# Patient Record
Sex: FEMALE | Race: WHITE | Employment: OTHER | URBAN - METROPOLITAN AREA
[De-identification: names, ages, dates, MRNs, and addresses within clinical notes are randomized per-mention and may not be internally consistent; named-entity substitution may affect disease eponyms.]

---

## 2017-06-21 ENCOUNTER — OFFICE VISIT (OUTPATIENT)
Dept: UROLOGY | Facility: CLINIC | Age: 77
End: 2017-06-21
Payer: COMMERCIAL

## 2017-06-21 DIAGNOSIS — N32.81 OAB (OVERACTIVE BLADDER): Primary | ICD-10-CM

## 2017-06-21 LAB
ALBUMIN UR-MCNC: NEGATIVE MG/DL
APPEARANCE UR: CLEAR
BILIRUB UR QL STRIP: NEGATIVE
COLOR UR AUTO: YELLOW
GLUCOSE UR STRIP-MCNC: NEGATIVE MG/DL
HGB UR QL STRIP: ABNORMAL
KETONES UR STRIP-MCNC: ABNORMAL MG/DL
LEUKOCYTE ESTERASE UR QL STRIP: ABNORMAL
NITRATE UR QL: POSITIVE
PH UR STRIP: 5 PH (ref 5–7)
SP GR UR STRIP: 1.02 (ref 1–1.03)
URN SPEC COLLECT METH UR: ABNORMAL
UROBILINOGEN UR STRIP-ACNC: 0.2 EU/DL (ref 0.2–1)

## 2017-06-21 PROCEDURE — 52287 CYSTOSCOPY CHEMODENERVATION: CPT | Performed by: UROLOGY

## 2017-06-21 PROCEDURE — 81003 URINALYSIS AUTO W/O SCOPE: CPT | Mod: QW | Performed by: UROLOGY

## 2017-06-21 NOTE — MR AVS SNAPSHOT
"              After Visit Summary   2017    Paulo Giron    MRN: [id number]           Patient Information     Date Of Birth          1940        Visit Information        Provider Department      2017 1:00 PM Osmin Guzman MD [facility] Bladder Control [facility]        Today's Diagnoses     OAB (overactive bladder)    -  1       Follow-ups after your visit        Follow-up notes from your care team     Return in about 2 weeks (around 2017).      Who to contact     If you have questions or need follow up information about today's clinic visit or your schedule please contact [facility] BLADDER CONTROL [facility] directly at [phone].  Normal or non-critical lab and imaging results will be communicated to you by MyChart, letter or phone within 4 business days after the clinic has received the results. If you do not hear from us within 7 days, please contact the clinic through MyChart or phone. If you have a critical or abnormal lab result, we will notify you by phone as soon as possible.  Submit refill requests through StrikeIron or call your pharmacy and they will forward the refill request to us. Please allow 3 business days for your refill to be completed.          Additional Information About Your Visit        MyChart Information     [employer] lets you send messages to your doctor, view your test results, renew your prescriptions, schedule appointments and more. To sign up, go to [email or web address] . Click on \"Log in\" on the left side of the screen, which will take you to the Welcome page. Then click on \"Sign up Now\" on the right side of the page.     You will be asked to enter the access code listed below, as well as some personal information. Please follow the directions to create your username and password.     Your access code is: [email or web address]  Expires: 2017  1:46 PM     Your access code will  in 90 days. If you need help or a new code, please " call your [place] clinic or [phone].        Care EveryWhere ID     This is your Care EveryWhere ID. This could be used by other organizations to access your [place] medical records  [id number]         Blood Pressure from Last 3 Encounters:   No data found for BP    Weight from Last 3 Encounters:   No data found for Wt              We Performed the Following     BOTULINUM TOXIN A PER UNIT     HC CYSTOURETHROSCOPY INJ CHEMODENERVATION BLADDER     UA without Microscopic        Primary Care Provider Office Phone # Fax #    Kristie Bowen PA-C [phone]       [facility] [place] [address] [facility] [address]  [facility] [address]        Equal Access to Services     [facility]: Hadii aad ku hadasho Soomaali, waaxda luqadaha, qaybta kaalmada adeegyada, waxfatou sterling hayaan aderodrigo wilson . So [facility] [phone].    ATENCIÓN: Si habla español, tiene a michaud disposición servicios gratuitos de asistencia lingüística. Llame al [phone].    We comply with applicable federal civil rights laws and [place] laws. We do not discriminate on the basis of race, color, national origin, age, disability sex, sexual orientation or gender identity.            Thank you!     Thank you for choosing [facility] FOR BLADDER CONTROL [facility]  for your care. Our goal is always to provide you with excellent care. Hearing back from our patients is one way we can continue to improve our services. Please take a few minutes to complete the written survey that you may receive in the mail after your visit with us. Thank you!             Your Updated Medication List - Protect others around you: Learn how to safely use, store and throw away your medicines at www.disposemymeds.org.          This list is accurate as of: 6/21/17  1:46 PM.  Always use your most recent med list.                   Brand Name Dispense Instructions for use Diagnosis    LOSARTAN POTASSIUM PO      Take 25 mg by mouth daily        OMEPRAZOLE PO       Take 20 mg by mouth every morning        SIMVASTATIN PO      Take 20 mg by mouth At Bedtime        triamcinolone 55 MCG/ACT Inhaler    NASACORT     Spray 2 sprays into both nostrils

## 2017-06-21 NOTE — PROGRESS NOTES
F/u OAB wet + probable ISD, satisfactory support, failed meds, and PT, s/p Botox #3 on 11/30/16    Has had some recurrence of OAB wet, is requesting next Botox.     Again discussed mech of action, risks, complications, recovery, results, need for repeat injections, retention; informed consent obtained.        Current Outpatient Prescriptions   Medication     SIMVASTATIN PO     LOSARTAN POTASSIUM PO     OMEPRAZOLE PO     triamcinolone (NASACORT) 55 MCG/ACT nasal inhaler     No current facility-administered medications for this visit.          Results for orders placed or performed in visit on 06/21/17   UA without Microscopic   Result Value Ref Range    Color Urine Yellow     Appearance Urine Clear     Glucose Urine Negative NEG mg/dL    Bilirubin Urine Negative NEG    Ketones Urine Trace (A) NEG mg/dL    Specific Gravity Urine 1.025 1.003 - 1.035    Blood Urine Small (A) NEG    pH Urine 5.0 5.0 - 7.0 pH    Protein Albumin Urine Negative NEG mg/dL    Urobilinogen Urine 0.2 0.2 - 1.0 EU/dL    Nitrite Urine Positive (A) NEG    Leukocyte Esterase Urine Moderate (A) NEG    Source Midstream Urine        Urojet for 10 minutes, then Botox A 100 units (lot [id number], Jan 2020) into 10 cc saline, then injected into posterior wall at 3 sites; excellent blebs and hemostasis.          IMP:  1. Botox for OAB wet      PLAN:  1. Discussed situation with patient in detail.  2. RTC 2 wks to review progress  3. Precautions  4. Levoquin 250 x 1 (pt has at home)  5. Copy A Jessi

## 2017-07-05 ENCOUNTER — OFFICE VISIT (OUTPATIENT)
Dept: UROLOGY | Facility: CLINIC | Age: 77
End: 2017-07-05
Payer: COMMERCIAL

## 2017-07-05 VITALS — SYSTOLIC BLOOD PRESSURE: 128 MMHG | HEART RATE: 72 BPM | DIASTOLIC BLOOD PRESSURE: 86 MMHG | RESPIRATION RATE: 12 BRPM

## 2017-07-05 DIAGNOSIS — N39.46 MIXED INCONTINENCE: Primary | ICD-10-CM

## 2017-07-05 PROCEDURE — 99213 OFFICE O/P EST LOW 20 MIN: CPT | Mod: 25 | Performed by: UROLOGY

## 2017-07-05 PROCEDURE — 51798 US URINE CAPACITY MEASURE: CPT | Performed by: UROLOGY

## 2017-07-05 NOTE — MR AVS SNAPSHOT
"              After Visit Summary   7/5/2017    Paulo Giron    MRN: [id number]           Patient Information     Date Of Birth          1940        Visit Information        Provider Department      7/5/2017 11:15 AM Osmin Guzman MD [facility]        Today's Diagnoses     Mixed incontinence    -  1       Follow-ups after your visit        Follow-up notes from your care team     Return in about 6 months (around 1/5/2018).      Your next 10 appointments already scheduled     Jan 11, 2018 10:30 AM CST   Return Visit with [facility]   [facility] ([facility])    [address] [place] [id number] [phone]              Who to contact     If you have questions or need follow up information about today's clinic visit or your schedule please contact [facility] directly at [phone].  Normal or non-critical lab and imaging results will be communicated to you by MyChart, letter or phone within 4 business days after the clinic has received the results. If you do not hear from us within 7 days, please contact the clinic through Medmonkhart or phone. If you have a critical or abnormal lab result, we will notify you by phone as soon as possible.  Submit refill requests through [employer] or call your pharmacy and they will forward the refill request to us. Please allow 3 business days for your refill to be completed.          Additional Information About Your Visit        Medmonkhart Information     [employer] lets you send messages to your doctor, view your test results, renew your prescriptions, schedule appointments and more. To sign up, go to [email or web address] . Click on \"Log in\" on the left side of the screen, which will take you to the Welcome page. Then click on \"Sign up Now\" on the right side of the page.     You will be asked to enter the access code listed below, as well as some personal information. Please follow the " directions to create your username and password.     Your access code is: [email or web address]  Expires: 2017  1:46 PM     Your access code will  in 90 days. If you need help or a new code, please call your [place] clinic or [phone].        Care EveryWhere ID     This is your Care EveryWhere ID. This could be used by other organizations to access your [place] medical records  [id number]        Your Vitals Were     Pulse Respirations                72 12           Blood Pressure from Last 3 Encounters:   17 128/86    Weight from Last 3 Encounters:   No data found for Wt              We Performed the Following     MEASURE POST-VOID RESIDUAL URINE/BLADDER CAPACITY, US NON-IMAGING (86602)     UA reflex to Microscopic and Culture        Primary Care Provider Office Phone # Fax #    Kristie Bowen PA-C [phone]       [facility] [place] [address]  [place] [address]        Equal Access to Services     [facility] : Hadii aad ku hadasho Soomaali, waaxda luqadaha, qaybta kaalmada adeegyada, waxay idiin hayaan anneeg khpablito wilson . So [facility] [phone].    ATENCIÓN: Si habla español, tiene a michaud disposición servicios gratuitos de asistencia lingüística. Llame al [phone].    We comply with applicable federal civil rights laws and [place] laws. We do not discriminate on the basis of race, color, national origin, age, disability sex, sexual orientation or gender identity.            Thank you!     Thank you for choosing [facility]  for your care. Our goal is always to provide you with excellent care. Hearing back from our patients is one way we can continue to improve our services. Please take a few minutes to complete the written survey that you may receive in the mail after your visit with us. Thank you!             Your Updated Medication List - Protect others around you: Learn how to safely use, store and throw away your medicines at www.disposemymeds.org.           This list is accurate as of: 7/5/17 11:30 AM.  Always use your most recent med list.                   Brand Name Dispense Instructions for use Diagnosis    LOSARTAN POTASSIUM PO      Take 25 mg by mouth daily        SIMVASTATIN PO      Take 20 mg by mouth At Bedtime        triamcinolone 55 MCG/ACT Inhaler    NASACORT     Spray 2 sprays into both nostrils

## 2017-07-05 NOTE — PROGRESS NOTES
Bladder Scan performed. 151 maximum residual urine detected after 3 scans. MD shawanda Villeda RN

## 2017-07-05 NOTE — PROGRESS NOTES
F/u OAB wet, ISD, Botox #4 with 100 units 6/21/17    Doing very well.    Denies dysuria, gross hematuria, frequency; can go 2 hours or shop 2 places without voiding. Noc x 1.    Wears one med pad per day and one per nite; only mild moisture when she takes it off.     Quite pleased      PVR: 151 cc    (Pt unable to void)      IMP:  1. OAB wet + ISD; doing very well on Botox      PLAN:  1. Discussed situation with patient in detail.  2. RTC 6 mos or sooner prn  3. 15 minutes spent with patient, more than 50% spent in counseling and coordination of care for OAB/ISD.  4. Copy A Bowen

## 2017-07-05 NOTE — NURSING NOTE
Chief Complaint   Patient presents with     RECHECK       Initial /86 (BP Location: Right arm, Patient Position: Chair, Cuff Size: Adult Regular)  Pulse 72  Resp 12 There is no height or weight on file to calculate BMI.  Medication Reconciliation: complete   Nayeli Villeda RN

## 2018-02-01 ENCOUNTER — OFFICE VISIT (OUTPATIENT)
Dept: UROLOGY | Facility: CLINIC | Age: 78
End: 2018-02-01
Payer: COMMERCIAL

## 2018-02-01 ENCOUNTER — TELEPHONE (OUTPATIENT)
Dept: UROLOGY | Facility: CLINIC | Age: 78
End: 2018-02-01

## 2018-02-01 VITALS — DIASTOLIC BLOOD PRESSURE: 86 MMHG | HEART RATE: 75 BPM | OXYGEN SATURATION: 96 % | SYSTOLIC BLOOD PRESSURE: 164 MMHG

## 2018-02-01 DIAGNOSIS — N39.46 MIXED INCONTINENCE: Primary | ICD-10-CM

## 2018-02-01 DIAGNOSIS — N32.81 OAB (OVERACTIVE BLADDER): ICD-10-CM

## 2018-02-01 DIAGNOSIS — N95.2 ATROPHIC VAGINITIS: ICD-10-CM

## 2018-02-01 LAB
ALBUMIN UR-MCNC: NEGATIVE MG/DL
APPEARANCE UR: CLEAR
BACTERIA #/AREA URNS HPF: ABNORMAL /HPF
BILIRUB UR QL STRIP: NEGATIVE
COLOR UR AUTO: YELLOW
GLUCOSE UR STRIP-MCNC: NEGATIVE MG/DL
HGB UR QL STRIP: NEGATIVE
KETONES UR STRIP-MCNC: NEGATIVE MG/DL
LEUKOCYTE ESTERASE UR QL STRIP: ABNORMAL
NITRATE UR QL: NEGATIVE
NON-SQ EPI CELLS #/AREA URNS LPF: ABNORMAL /LPF
PH UR STRIP: 6 PH (ref 5–7)
RBC #/AREA URNS AUTO: ABNORMAL /HPF
SOURCE: ABNORMAL
SP GR UR STRIP: <=1.005 (ref 1–1.03)
UROBILINOGEN UR STRIP-ACNC: 0.2 EU/DL (ref 0.2–1)
WBC #/AREA URNS AUTO: ABNORMAL /HPF
WBC CLUMPS #/AREA URNS HPF: PRESENT /HPF

## 2018-02-01 PROCEDURE — 81001 URINALYSIS AUTO W/SCOPE: CPT | Performed by: UROLOGY

## 2018-02-01 PROCEDURE — 52287 CYSTOSCOPY CHEMODENERVATION: CPT | Performed by: UROLOGY

## 2018-02-01 RX ORDER — ESTRADIOL 0.1 MG/G
2 CREAM VAGINAL
Qty: 0.5 G | Refills: 3 | Status: SHIPPED | OUTPATIENT
Start: 2018-02-02 | End: 2018-10-11

## 2018-02-01 RX ORDER — CIPROFLOXACIN 500 MG/1
500 TABLET, FILM COATED ORAL 2 TIMES DAILY
Qty: 1 TABLET | Refills: 0
Start: 2018-02-01 | End: 2018-09-26

## 2018-02-01 NOTE — PROGRESS NOTES
F/u OAB wet, ISD, Botox #4 with 100 units 6/21/17, here for Botox #5    Again discussed nature of Botox, risks, complications; informed consent obtained.        Current Outpatient Prescriptions   Medication     botulinum toxin type A (BOTOX) 100 UNITS injection     VITAMIN D, CHOLECALCIFEROL, PO     SIMVASTATIN PO     LOSARTAN POTASSIUM PO     triamcinolone (NASACORT) 55 MCG/ACT nasal inhaler     ASPIRIN PO     No current facility-administered medications for this visit.        PE: External and internal genitalia quite atrophic      Results for orders placed or performed in visit on 02/01/18   UA reflex to Microscopic   Result Value Ref Range    Color Urine Yellow     Appearance Urine Clear     Glucose Urine Negative NEG^Negative mg/dL    Bilirubin Urine Negative NEG^Negative    Ketones Urine Negative NEG^Negative mg/dL    Specific Gravity Urine <=1.005 1.003 - 1.035    Blood Urine Negative NEG^Negative    pH Urine 6.0 5.0 - 7.0 pH    Protein Albumin Urine Negative NEG^Negative mg/dL    Urobilinogen Urine 0.2 0.2 - 1.0 EU/dL    Nitrite Urine Negative NEG^Negative    Leukocyte Esterase Urine Trace (A) NEG^Negative    Source Midstream Urine    Urine Microscopic   Result Value Ref Range    WBC Urine 2-5 (A) OTO2^O - 2 /HPF    RBC Urine O - 2 OTO2^O - 2 /HPF    WBC Clumps Present (A) NEG^Negative /HPF    Squamous Epithelial /LPF Urine Moderate (A) FEW^Few /LPF    Bacteria Urine Moderate (A) NEG^Negative /HPF           Urojet for 10 minutes. Then Botox A 100 units into 10 cc of saline, injected into 2 sites in posterior wall; excellent blebs and hemostasis.      IMP:  1. Botox #5 for OAB   2. Atrophic vaginitis      PLAN:  1. Discussed situation with patient in detail.  2. RTC 2 wks  3. Precautions  4. Cipro x 1  5. Start Estrace cream MWF; carefully instructed in application, mech of action, etc; pt elects trial

## 2018-02-01 NOTE — PROGRESS NOTES
The following medication was given:     MEDICATION:  Ciprofloxin  ROUTE: PO  SITE: mouth  DOSE: 500  LOT #: [id number]  : [employer]  EXPIRATION DATE: 12/18  NDC#: 38418-428-57   Was there drug waste? No  Due to injection administration, patient instructed to remain in clinic for 15 minutes  afterwards, and to report any adverse reaction to me immediately.

## 2018-02-01 NOTE — MR AVS SNAPSHOT
"              After Visit Summary   2/1/2018    Paulo Giron    MRN: [id number]           Patient Information     Date Of Birth          1940        Visit Information        Provider Department      2/1/2018 10:00 AM Osmin Guzmna MD [facility]        Today's Diagnoses     Mixed incontinence    -  1    OAB (overactive bladder)        Atrophic vaginitis           Follow-ups after your visit        Follow-up notes from your care team     Return in about 2 weeks (around 2/15/2018).      Your next 10 appointments already scheduled     Feb 15, 2018 10:00 AM CST   [facility]   [facility]    [address] [place] [id number] [phone]              Who to contact     If you have questions or need follow up information about today's clinic visit or your schedule please contact [facility] directly at [phone].  Normal or non-critical lab and imaging results will be communicated to you by Clerkyhart, letter or phone within 4 business days after the clinic has received the results. If you do not hear from us within 7 days, please contact the clinic through Clerkyhart or phone. If you have a critical or abnormal lab result, we will notify you by phone as soon as possible.  Submit refill requests through [employer] or call your pharmacy and they will forward the refill request to us. Please allow 3 business days for your refill to be completed.          Additional Information About Your Visit        MyChart Information     [employer] lets you send messages to your doctor, view your test results, renew your prescriptions, schedule appointments and more. To sign up, go to [email or web address] . Click on \"Log in\" on the left side of the screen, which will take you to the Welcome page. Then click on \"Sign up Now\" on the right side of the page.     You will be asked to enter the access code listed below, as " well as some personal information. Please follow the directions to create your username and password.     Your access code is: [email or web address]  Expires: 2018 10:24 AM     Your access code will  in 90 days. If you need help or a new code, please call your [place] clinic or [phone].        Care EveryWhere ID     This is your Care EveryWhere ID. This could be used by other organizations to access your [place] medical records  [id number]        Your Vitals Were     Pulse Pulse Oximetry                75 96%           Blood Pressure from Last 3 Encounters:   18 164/86   17 128/86    Weight from Last 3 Encounters:   No data found for Wt              We Performed the Following     BOTULINUM TOXIN A PER UNIT []     CYSTOURETHROSCOPY INJ CHEMODENERVATION BLADDER (65765)     UA reflex to Microscopic     Urine Microscopic          Today's Medication Changes          These changes are accurate as of 18 10:24 AM.  If you have any questions, ask your nurse or doctor.               Start taking these medicines.        Dose/Directions    ciprofloxacin 500 MG tablet   Commonly known as:  CIPRO   Used for:  OAB (overactive bladder)   Started by:  Osmin Guzman MD        Dose:  500 mg   Take 1 tablet (500 mg) by mouth 2 times daily   Quantity:  1 tablet   Refills:  0       estradiol 0.1 MG/GM cream   Commonly known as:  ESTRACE VAGINAL   Used for:  Atrophic vaginitis   Started by:  Osmin Guzman MD        Dose:  2 g   Start taking on:  2018   Place 2 g vaginally three times a week   Quantity:  0.5 g   Refills:  3            Where to get your medicines      These medications were sent to [facility] [id number]  [place], [place] - [address] [facility] [address], [facility] [address]    Hours:  24-hours Phone:  [phone]     estradiol 0.1 MG/GM cream         Some of these will need a paper prescription and others can be bought over the  counter.  Ask your nurse if you have questions.     You don't need a prescription for these medications     ciprofloxacin 500 MG tablet                Primary Care Provider Office Phone # Fax #    Kristie Bowen PA-C [phone]       [facility] [address] [address]        Equal Access to Services     [facility] : Hadii aad ku hadasho Soomaali, waaxda luqadaha, qaybta kaalmada adeegyada, waxay fransiscain hayaan aderodrigo khnataliesh latomas . So [facility] [phone].    ATENCIÓN: Si habla español, tiene a michaud disposición servicios gratuitos de asistencia lingüística. Raiza al [phone].    We comply with applicable federal civil rights laws and [place] laws. We do not discriminate on the basis of race, color, national origin, age, disability, sex, sexual orientation, or gender identity.            Thank you!     Thank you for choosing [facility]  for your care. Our goal is always to provide you with excellent care. Hearing back from our patients is one way we can continue to improve our services. Please take a few minutes to complete the written survey that you may receive in the mail after your visit with us. Thank you!             Your Updated Medication List - Protect others around you: Learn how to safely use, store and throw away your medicines at www.disposemymeds.org.          This list is accurate as of 2/1/18 10:24 AM.  Always use your most recent med list.                   Brand Name Dispense Instructions for use Diagnosis    ASPIRIN PO      Take 81 mg by mouth        BOTOX 100 UNITS injection   Generic drug:  botulinum toxin type A      Inject 100 Units into the muscle once        ciprofloxacin 500 MG tablet    CIPRO    1 tablet    Take 1 tablet (500 mg) by mouth 2 times daily    OAB (overactive bladder)       estradiol 0.1 MG/GM cream   Start taking on:  2/2/2018    ESTRACE VAGINAL    0.5 g    Place 2 g vaginally three times a week    Atrophic vaginitis        LOSARTAN POTASSIUM PO      Take 25 mg by mouth daily        SIMVASTATIN PO      Take 20 mg by mouth At Bedtime        triamcinolone 55 MCG/ACT Inhaler    NASACORT     Spray 2 sprays into both nostrils        VITAMIN D (CHOLECALCIFEROL) PO      Take 1,000 Units by mouth daily

## 2018-02-01 NOTE — TELEPHONE ENCOUNTER
Reason for Call:  Other call back    Detailed comments: Patient was just seen and prescribed estradiol (ESTRACE VAGINAL) 0.1 MG/GM cream. Patient went to pharmacy to have it filled and the cost is over $300.00. Is there a generic or an alternate medication? Please call    Phone Number Patient can be reached at: Home number on file [phone] (home)    Best Time: Any     Can we leave a detailed message on this number? YES    Call taken on 2/1/2018 at 3:10 PM by Carmen Shore

## 2018-02-02 NOTE — TELEPHONE ENCOUNTER
I contacted pharmacy and other comparable medications are similarly priced. Patient advised. She will discuss medication at her visit on 2/15.  Ameena Phillips, CMA

## 2018-02-15 ENCOUNTER — OFFICE VISIT (OUTPATIENT)
Dept: UROLOGY | Facility: CLINIC | Age: 78
End: 2018-02-15
Payer: COMMERCIAL

## 2018-02-15 VITALS
DIASTOLIC BLOOD PRESSURE: 104 MMHG | RESPIRATION RATE: 16 BRPM | HEART RATE: 72 BPM | OXYGEN SATURATION: 97 % | SYSTOLIC BLOOD PRESSURE: 160 MMHG

## 2018-02-15 DIAGNOSIS — R82.90 NONSPECIFIC FINDING ON EXAMINATION OF URINE: ICD-10-CM

## 2018-02-15 DIAGNOSIS — N39.46 MIXED INCONTINENCE: Primary | ICD-10-CM

## 2018-02-15 DIAGNOSIS — N95.2 ATROPHIC VAGINITIS: ICD-10-CM

## 2018-02-15 LAB
ALBUMIN UR-MCNC: NEGATIVE MG/DL
APPEARANCE UR: CLEAR
BACTERIA #/AREA URNS HPF: ABNORMAL /HPF
BILIRUB UR QL STRIP: NEGATIVE
COLOR UR AUTO: YELLOW
GLUCOSE UR STRIP-MCNC: NEGATIVE MG/DL
HGB UR QL STRIP: ABNORMAL
KETONES UR STRIP-MCNC: NEGATIVE MG/DL
LEUKOCYTE ESTERASE UR QL STRIP: ABNORMAL
NITRATE UR QL: POSITIVE
NON-SQ EPI CELLS #/AREA URNS LPF: ABNORMAL /LPF
PH UR STRIP: 5.5 PH (ref 5–7)
RBC #/AREA URNS AUTO: ABNORMAL /HPF
SOURCE: ABNORMAL
SP GR UR STRIP: 1.01 (ref 1–1.03)
UROBILINOGEN UR STRIP-ACNC: 0.2 EU/DL (ref 0.2–1)
WBC #/AREA URNS AUTO: ABNORMAL /HPF

## 2018-02-15 PROCEDURE — 87186 SC STD MICRODIL/AGAR DIL: CPT | Performed by: UROLOGY

## 2018-02-15 PROCEDURE — 87086 URINE CULTURE/COLONY COUNT: CPT | Performed by: UROLOGY

## 2018-02-15 PROCEDURE — 81001 URINALYSIS AUTO W/SCOPE: CPT | Performed by: UROLOGY

## 2018-02-15 PROCEDURE — 87088 URINE BACTERIA CULTURE: CPT | Performed by: UROLOGY

## 2018-02-15 PROCEDURE — 51798 US URINE CAPACITY MEASURE: CPT | Performed by: UROLOGY

## 2018-02-15 PROCEDURE — 99213 OFFICE O/P EST LOW 20 MIN: CPT | Mod: 25 | Performed by: UROLOGY

## 2018-02-15 RX ORDER — NITROFURANTOIN 25; 75 MG/1; MG/1
100 CAPSULE ORAL 2 TIMES DAILY
Qty: 10 CAPSULE | Refills: 0 | Status: SHIPPED | OUTPATIENT
Start: 2018-02-15 | End: 2018-02-20

## 2018-02-15 NOTE — MR AVS SNAPSHOT
"              After Visit Summary   2/15/2018    Paulo Giron    MRN: [id number]           Patient Information     Date Of Birth          1940        Visit Information        Provider Department      2/15/2018 10:00 AM Osmin Guzman MD [facility]        Today's Diagnoses     Mixed incontinence    -  1    Nonspecific finding on examination of urine        Atrophic vaginitis           Follow-ups after your visit        Follow-up notes from your care team     Return in about 6 months (around 8/15/2018).      Who to contact     If you have questions or need follow up information about today's clinic visit or your schedule please contact [facility] directly at [phone].  Normal or non-critical lab and imaging results will be communicated to you by MyChart, letter or phone within 4 business days after the clinic has received the results. If you do not hear from us within 7 days, please contact the clinic through MyChart or phone. If you have a critical or abnormal lab result, we will notify you by phone as soon as possible.  Submit refill requests through Splendia or call your pharmacy and they will forward the refill request to us. Please allow 3 business days for your refill to be completed.          Additional Information About Your Visit        MyChart Information     Splendia lets you send messages to your doctor, view your test results, renew your prescriptions, schedule appointments and more. To sign up, go to [email or web address] . Click on \"Log in\" on the left side of the screen, which will take you to the Welcome page. Then click on \"Sign up Now\" on the right side of the page.     You will be asked to enter the access code listed below, as well as some personal information. Please follow the directions to create your username and password.     Your access code is: [email or web address]  Expires: 2018 10:24 AM     Your access code will  in 90 days. If you need " help or a new code, please call your [facility] clinic or [phone].        Care EveryWhere ID     This is your Care EveryWhere ID. This could be used by other organizations to access your [facility] medical records  [id number]        Your Vitals Were     Pulse Respirations Pulse Oximetry             72 16 97%          Blood Pressure from Last 3 Encounters:   02/01/18 164/86   07/05/17 128/86    Weight from Last 3 Encounters:   No data found for Wt              We Performed the Following     MEASURE POST-VOID RESIDUAL URINE/BLADDER CAPACITY, US NON-IMAGING     UA reflex to Microscopic and Culture     Urine Culture Aerobic Bacterial     Urine Microscopic          Today's Medication Changes          These changes are accurate as of 2/15/18 10:31 AM.  If you have any questions, ask your nurse or doctor.               Start taking these medicines.        Dose/Directions    conjugated estrogens cream   Commonly known as:  PREMARIN   Used for:  Atrophic vaginitis   Started by:  Osmin Guzman MD        Dose:  0.5 g   Start taking on:  2/16/2018   Place 0.5 g vaginally three times a week   Quantity:  30 g   Refills:  6       nitroFURantoin (macrocrystal-monohydrate) 100 MG capsule   Commonly known as:  MACROBID   Used for:  Nonspecific finding on examination of urine   Started by:  Osmin Guzman MD        Dose:  100 mg   Take 1 capsule (100 mg) by mouth 2 times daily for 10 doses   Quantity:  10 capsule   Refills:  0            Where to get your medicines      These medications were sent to [facility] [id number] [place] [address] AT [facility] [address] [address], [facility] [address]    Hours:  24-hours Phone:  [phone]     nitroFURantoin (macrocrystal-monohydrate) 100 MG capsule         Some of these will need a paper prescription and others can be bought over the counter.  Ask your nurse if you have questions.     Bring a paper prescription for each of these  medications     conjugated estrogens cream                Primary Care Provider Office Phone # Fax #    Kristie Bowen PA-C [phone]       [facility] [address] [address]        Equal Access to Services     NEGRITA SCHUSTER : Raymond gato oconnor edward Sochastity, waquentinda luqadaha, qaybta kaalmada adejeff, suad hugginsmckenzie rodríguezrodrigo renner katie mendieta. So [facility] [phone].    ATENCIÓN: Si habla español, tiene a michaud disposición servicios gratuitos de asistencia lingüística. Llame al [phone].    We comply with applicable federal civil rights laws and [place] laws. We do not discriminate on the basis of race, color, national origin, age, disability, sex, sexual orientation, or gender identity.            Thank you!     Thank you for choosing [facility]  for your care. Our goal is always to provide you with excellent care. Hearing back from our patients is one way we can continue to improve our services. Please take a few minutes to complete the written survey that you may receive in the mail after your visit with us. Thank you!             Your Updated Medication List - Protect others around you: Learn how to safely use, store and throw away your medicines at www.disposemymeds.org.          This list is accurate as of 2/15/18 10:31 AM.  Always use your most recent med list.                   Brand Name Dispense Instructions for use Diagnosis    ASPIRIN PO      Take 81 mg by mouth        BOTOX 100 UNITS injection   Generic drug:  botulinum toxin type A      Inject 100 Units into the muscle once        ciprofloxacin 500 MG tablet    CIPRO    1 tablet    Take 1 tablet (500 mg) by mouth 2 times daily    OAB (overactive bladder)       conjugated estrogens cream   Start taking on:  2/16/2018    PREMARIN    30 g    Place 0.5 g vaginally three times a week    Atrophic vaginitis       estradiol 0.1 MG/GM cream    ESTRACE VAGINAL    0.5 g    Place 2 g vaginally three times a  week    Atrophic vaginitis       LOSARTAN POTASSIUM PO      Take 25 mg by mouth daily        nitroFURantoin (macrocrystal-monohydrate) 100 MG capsule    MACROBID    10 capsule    Take 1 capsule (100 mg) by mouth 2 times daily for 10 doses    Nonspecific finding on examination of urine       SIMVASTATIN PO      Take 20 mg by mouth At Bedtime        triamcinolone 55 MCG/ACT Inhaler    NASACORT     Spray 2 sprays into both nostrils        VITAMIN D (CHOLECALCIFEROL) PO      Take 1,000 Units by mouth daily

## 2018-02-15 NOTE — PROGRESS NOTES
F/u OAB wet, ISD, Botox #4 with 100 units 6/21/17, s/p Botox #5 on 2/1/18    Bladder control is much better; wears one med pad per day and one at nite.    Denies dysuria, gross hematuria, frequency; can sit through a 2-hour movie; last nite slept 5 hrs without voiding.    BM's are satisfactory.    Did not fill Estrace script due to cost.        Results for orders placed or performed in visit on 02/15/18   UA reflex to Microscopic and Culture   Result Value Ref Range    Color Urine Yellow     Appearance Urine Clear     Glucose Urine Negative NEG^Negative mg/dL    Bilirubin Urine Negative NEG^Negative    Ketones Urine Negative NEG^Negative mg/dL    Specific Gravity Urine 1.010 1.003 - 1.035    Blood Urine Trace (A) NEG^Negative    pH Urine 5.5 5.0 - 7.0 pH    Protein Albumin Urine Negative NEG^Negative mg/dL    Urobilinogen Urine 0.2 0.2 - 1.0 EU/dL    Nitrite Urine Positive (A) NEG^Negative    Leukocyte Esterase Urine Trace (A) NEG^Negative    Source Midstream Urine    Urine Microscopic   Result Value Ref Range    WBC Urine 5-10 (A) OTO2^O - 2 /HPF    RBC Urine O - 2 OTO2^O - 2 /HPF    Squamous Epithelial /LPF Urine Moderate (A) FEW^Few /LPF    Bacteria Urine Many (A) NEG^Negative /HPF       PVR: 122 c      IMP:  1. OAB wet, improved with Botox  2. PVR slightly elevated  3. Probable UTI  4. Atrophic vaginitis      PLAN:  1. Discussed situation with patient in detail.  2. UC; emperic Macrobid x 5 d  3. RTC 6 mos for next Botox  4. Pt will look into topical HRT from [facility]  5. 15 minutes spent with patient, more than 50% spent in counseling and coordination of care for OAB/UTI.  6. Copy PMD

## 2018-02-17 LAB
BACTERIA SPEC CULT: ABNORMAL
SPECIMEN SOURCE: ABNORMAL

## 2018-07-23 ENCOUNTER — TELEPHONE (OUTPATIENT)
Dept: UROLOGY | Facility: CLINIC | Age: 78
End: 2018-07-23

## 2018-07-23 NOTE — TELEPHONE ENCOUNTER
Reason for Call:  Other call back    Detailed comments: Patient needs August 9th rescheduled to a different date. Please advise.     Phone Number Patient can be reached at: Cell number on file:    Telephone Information:   Mobile [phone]       Best Time: any     Can we leave a detailed message on this number? YES    Call taken on 7/23/2018 at 12:01 PM by Ritchie Florian

## 2018-08-13 ENCOUNTER — TELEPHONE (OUTPATIENT)
Dept: UROLOGY | Facility: CLINIC | Age: 78
End: 2018-08-13

## 2018-08-13 NOTE — TELEPHONE ENCOUNTER
Surgery Pre-Certification    Medical Record Number: [id number]  Paulo Giron  YOB: 1940   Phone: [phone] (home)   Primary Provider: Kristie Bowen    Diagnosis:  oab    Surgeon: esteban  Surgical Procedure: cysto botox CPT code , 64246  BMI:  na  ICD-10 Coded: N32.81  Hospital: in clinic  In-Patient/ Out-Patient status: Outpatient  Length of surgery: 20 min  Anesthesia: local  Implanted Cardiac Device: No    Date of Surgery: 9/12  When post-op appointment needed:  2 weeks  Special Requests/ Equipment:: botox  CPM Needed: NA  Requestor:  Ameena Phillips CMA    No prior auth required. Medicare is primary. Follows medicare guidelines.

## 2018-09-12 ENCOUNTER — OFFICE VISIT (OUTPATIENT)
Dept: UROLOGY | Facility: CLINIC | Age: 78
End: 2018-09-12
Payer: COMMERCIAL

## 2018-09-12 VITALS — SYSTOLIC BLOOD PRESSURE: 167 MMHG | HEART RATE: 66 BPM | DIASTOLIC BLOOD PRESSURE: 89 MMHG | OXYGEN SATURATION: 96 %

## 2018-09-12 DIAGNOSIS — R30.0 DYSURIA: ICD-10-CM

## 2018-09-12 DIAGNOSIS — N32.81 OAB (OVERACTIVE BLADDER): Primary | ICD-10-CM

## 2018-09-12 LAB
ALBUMIN UR-MCNC: NEGATIVE MG/DL
APPEARANCE UR: CLEAR
BACTERIA #/AREA URNS HPF: ABNORMAL /HPF
BILIRUB UR QL STRIP: NEGATIVE
COLOR UR AUTO: YELLOW
GLUCOSE UR STRIP-MCNC: NEGATIVE MG/DL
HGB UR QL STRIP: ABNORMAL
KETONES UR STRIP-MCNC: NEGATIVE MG/DL
LEUKOCYTE ESTERASE UR QL STRIP: ABNORMAL
NITRATE UR QL: POSITIVE
PH UR STRIP: 7 PH (ref 5–7)
RBC #/AREA URNS AUTO: ABNORMAL /HPF
SOURCE: ABNORMAL
SP GR UR STRIP: 1.01 (ref 1–1.03)
UROBILINOGEN UR STRIP-ACNC: 0.2 EU/DL (ref 0.2–1)
WBC #/AREA URNS AUTO: ABNORMAL /HPF

## 2018-09-12 PROCEDURE — 52287 CYSTOSCOPY CHEMODENERVATION: CPT | Performed by: UROLOGY

## 2018-09-12 PROCEDURE — 87086 URINE CULTURE/COLONY COUNT: CPT | Performed by: UROLOGY

## 2018-09-12 PROCEDURE — 87186 SC STD MICRODIL/AGAR DIL: CPT | Performed by: UROLOGY

## 2018-09-12 PROCEDURE — 87088 URINE BACTERIA CULTURE: CPT | Performed by: UROLOGY

## 2018-09-12 PROCEDURE — 81001 URINALYSIS AUTO W/SCOPE: CPT | Performed by: UROLOGY

## 2018-09-12 RX ORDER — NITROFURANTOIN 25; 75 MG/1; MG/1
100 CAPSULE ORAL 2 TIMES DAILY
Qty: 10 CAPSULE | Refills: 0 | Status: SHIPPED | OUTPATIENT
Start: 2018-09-12 | End: 2018-09-17

## 2018-09-12 NOTE — MR AVS SNAPSHOT
"              After Visit Summary   9/12/2018    Paulo Giron    MRN: [id number]           Patient Information     Date Of Birth          1940        Visit Information        Provider Department      9/12/2018 10:00 AM Osmin Guzman MD; [facility]        Today's Diagnoses     OAB (overactive bladder)    -  1    Dysuria           Follow-ups after your visit        Follow-up notes from your care team     Return in about 2 weeks (around 9/26/2018).      Your next 10 appointments already scheduled     Sep 26, 2018 10:00 AM CDT   Return Visit with Osmin Guzman MD   [facility]    [address] [place] [address] [phone]              Who to contact     If you have questions or need follow up information about today's clinic visit or your schedule please contact [facility] directly at [phone].  Normal or non-critical lab and imaging results will be communicated to you by PeptiVirhart, letter or phone within 4 business days after the clinic has received the results. If you do not hear from us within 7 days, please contact the clinic through Sample6t or phone. If you have a critical or abnormal lab result, we will notify you by phone as soon as possible.  Submit refill requests through Clean Runner or call your pharmacy and they will forward the refill request to us. Please allow 3 business days for your refill to be completed.          Additional Information About Your Visit        PeptiVirhart Information     Clean Runner lets you send messages to your doctor, view your test results, renew your prescriptions, schedule appointments and more. To sign up, go to [email or web address] . Click on \"Log in\" on the left side of the screen, which will take you to the Welcome page. Then click on \"Sign up Now\" on the right side of the page.     You will be asked to enter the access code listed below, as well as " some personal information. Please follow the directions to create your username and password.     Your access code is: [email or web address]  Expires: 2018  9:41 AM     Your access code will  in 90 days. If you need help or a new code, please call your [facility] clinic or [phone].        Care EveryWhere ID     This is your Care EveryWhere ID. This could be used by other organizations to access your [place] medical records  [id number]        Your Vitals Were     Pulse Pulse Oximetry                66 96%           Blood Pressure from Last 3 Encounters:   18 167/89   02/15/18 (!) 160/104   18 164/86    Weight from Last 3 Encounters:   No data found for Wt              We Performed the Following     BOTULINUM TOXIN A PER UNIT []     CYSTOURETHROSCOPY INJ CHEMODENERVATION BLADDER (15731)     UA reflex to Microscopic     Urine Culture Aerobic Bacterial     Urine Microscopic          Today's Medication Changes          These changes are accurate as of 18 12:14 PM.  If you have any questions, ask your nurse or doctor.               Start taking these medicines.        Dose/Directions    nitroFURantoin (macrocrystal-monohydrate) 100 MG capsule   Commonly known as:  MACROBID   Used for:  Dysuria   Started by:  Osmin Guzman MD        Dose:  100 mg   Take 1 capsule (100 mg) by mouth 2 times daily for 10 doses   Quantity:  10 capsule   Refills:  0            Where to get your medicines      These medications were sent to [facility] [address] [place] [address] AT [facility] [address] [address], [facility] [address]    Hours:  24-hours Phone:  [phone]     nitroFURantoin (macrocrystal-monohydrate) 100 MG capsule                Primary Care Provider Office Phone # Fax #    Kristie Bowen PA-C [phone]       [facility] [address]  [facility] [address]        Equal Access to Services     NEGRITA SHCUSTER AH: Raymond hoskins  anastasia Gutierrez, waquentinda luqadaha, qaybta kaalmada penelope, suad fransiscain hayaamckenzie rodríguezrodrigo fridapablito laeduardomckenzie gayatri. So [facility] [phone].    ATENCIÓN: Si habla bogdan, tiene a michaud disposición servicios gratuitos de asistencia lingüística. Raiza al [phone].    We comply with applicable federal civil rights laws and [place] laws. We do not discriminate on the basis of race, color, national origin, age, disability, sex, sexual orientation, or gender identity.            Thank you!     Thank you for choosing [facility]  for your care. Our goal is always to provide you with excellent care. Hearing back from our patients is one way we can continue to improve our services. Please take a few minutes to complete the written survey that you may receive in the mail after your visit with us. Thank you!             Your Updated Medication List - Protect others around you: Learn how to safely use, store and throw away your medicines at www.disposemymeds.org.          This list is accurate as of 9/12/18 12:14 PM.  Always use your most recent med list.                   Brand Name Dispense Instructions for use Diagnosis    ASPIRIN PO      Take 81 mg by mouth        BOTOX 100 units injection   Generic drug:  botulinum toxin type A      Inject 100 Units into the muscle once        ciprofloxacin 500 MG tablet    CIPRO    1 tablet    Take 1 tablet (500 mg) by mouth 2 times daily    OAB (overactive bladder)       conjugated estrogens cream    PREMARIN    30 g    Place 0.5 g vaginally three times a week    Atrophic vaginitis       estradiol 0.1 MG/GM cream    ESTRACE VAGINAL    0.5 g    Place 2 g vaginally three times a week    Atrophic vaginitis       LOSARTAN POTASSIUM PO      Take 25 mg by mouth daily        nitroFURantoin (macrocrystal-monohydrate) 100 MG capsule    MACROBID    10 capsule    Take 1 capsule (100 mg) by mouth 2 times daily for 10 doses    Dysuria       SIMVASTATIN PO      Take 20 mg by mouth At Bedtime         triamcinolone 55 MCG/ACT inhaler    NASACORT     Spray 2 sprays into both nostrils        VITAMIN D (CHOLECALCIFEROL) PO      Take 1,000 Units by mouth daily

## 2018-09-12 NOTE — PROGRESS NOTES
F/u OAB wet, ISD, Botox #5 100 units on 2/1/18, here for Botox #6      Denies dysuria, gross hematuria, flank pain, systemic toxicity. Requesting next Botox rx.        Results for orders placed or performed in visit on 09/12/18   UA reflex to Microscopic   Result Value Ref Range    Color Urine Yellow     Appearance Urine Clear     Glucose Urine Negative NEG^Negative mg/dL    Bilirubin Urine Negative NEG^Negative    Ketones Urine Negative NEG^Negative mg/dL    Specific Gravity Urine 1.010 1.003 - 1.035    Blood Urine Trace (A) NEG^Negative    pH Urine 7.0 5.0 - 7.0 pH    Protein Albumin Urine Negative NEG^Negative mg/dL    Urobilinogen Urine 0.2 0.2 - 1.0 EU/dL    Nitrite Urine Positive (A) NEG^Negative    Leukocyte Esterase Urine Small (A) NEG^Negative    Source Midstream Urine    Urine Microscopic   Result Value Ref Range    WBC Urine 5-10 (A) OTO5^0 - 5 /HPF    RBC Urine 2-5 (A) OTO2^O - 2 /HPF    Bacteria Urine Many (A) NEG^Negative /HPF       Urojet for 10 minutes. Then Botox 100 units in 10 ml saline, injected into posterior wall at 3 sites; excellent blebs and hemostasis.       IMP:  1. Botox #6 for OAB      PLAN:  1. Discussed situation with patient in detail.  2. Macrobid x 5 d  3. RTC 2 wks  4. Precautions  5. Copy

## 2018-09-15 LAB
BACTERIA SPEC CULT: ABNORMAL
BACTERIA SPEC CULT: ABNORMAL
SPECIMEN SOURCE: ABNORMAL

## 2018-09-26 ENCOUNTER — OFFICE VISIT (OUTPATIENT)
Dept: UROLOGY | Facility: CLINIC | Age: 78
End: 2018-09-26
Payer: COMMERCIAL

## 2018-09-26 VITALS — HEART RATE: 76 BPM | DIASTOLIC BLOOD PRESSURE: 74 MMHG | RESPIRATION RATE: 14 BRPM | SYSTOLIC BLOOD PRESSURE: 138 MMHG

## 2018-09-26 DIAGNOSIS — N39.46 MIXED INCONTINENCE: Primary | ICD-10-CM

## 2018-09-26 LAB
ALBUMIN UR-MCNC: NEGATIVE MG/DL
APPEARANCE UR: CLEAR
BACTERIA #/AREA URNS HPF: ABNORMAL /HPF
BILIRUB UR QL STRIP: NEGATIVE
COLOR UR AUTO: YELLOW
GLUCOSE UR STRIP-MCNC: 100 MG/DL
HGB UR QL STRIP: ABNORMAL
KETONES UR STRIP-MCNC: NEGATIVE MG/DL
LEUKOCYTE ESTERASE UR QL STRIP: ABNORMAL
NITRATE UR QL: POSITIVE
NON-SQ EPI CELLS #/AREA URNS LPF: ABNORMAL /LPF
PH UR STRIP: 5.5 PH (ref 5–7)
RBC #/AREA URNS AUTO: ABNORMAL /HPF
SOURCE: ABNORMAL
SP GR UR STRIP: 1.01 (ref 1–1.03)
UROBILINOGEN UR STRIP-ACNC: 0.2 EU/DL (ref 0.2–1)
WBC #/AREA URNS AUTO: ABNORMAL /HPF

## 2018-09-26 PROCEDURE — 87186 SC STD MICRODIL/AGAR DIL: CPT | Performed by: UROLOGY

## 2018-09-26 PROCEDURE — 87088 URINE BACTERIA CULTURE: CPT | Performed by: UROLOGY

## 2018-09-26 PROCEDURE — 87086 URINE CULTURE/COLONY COUNT: CPT | Performed by: UROLOGY

## 2018-09-26 PROCEDURE — 99213 OFFICE O/P EST LOW 20 MIN: CPT | Mod: 25 | Performed by: UROLOGY

## 2018-09-26 PROCEDURE — 81001 URINALYSIS AUTO W/SCOPE: CPT | Performed by: UROLOGY

## 2018-09-26 PROCEDURE — 51798 US URINE CAPACITY MEASURE: CPT | Performed by: UROLOGY

## 2018-09-26 RX ORDER — CETIRIZINE HYDROCHLORIDE 10 MG/1
10 TABLET ORAL
COMMUNITY
Start: 2018-09-06 | End: 2024-03-04

## 2018-09-26 RX ORDER — NITROFURANTOIN 25; 75 MG/1; MG/1
100 CAPSULE ORAL 2 TIMES DAILY
Qty: 10 CAPSULE | Refills: 0 | Status: SHIPPED | OUTPATIENT
Start: 2018-09-26 | End: 2019-03-07

## 2018-09-26 NOTE — MR AVS SNAPSHOT
"              After Visit Summary   9/26/2018    Paulo Giron    MRN: [id number]           Patient Information     Date Of Birth          1940        Visit Information        Provider Department      9/26/2018 10:00 AM Osmin Guzman MD [facility]        Today's Diagnoses     Mixed incontinence    -  1       Follow-ups after your visit        Follow-up notes from your care team     Return in about 2 weeks (around 10/10/2018).      Your next 10 appointments already scheduled     Oct 11, 2018 10:15 AM CDT   Return Visit with Osmin Guzman MD   [facility] ([facility]    [address] [place] [id number] [phone]              Who to contact     If you have questions or need follow up information about today's clinic visit or your schedule please contact [facility] directly at [phone].  Normal or non-critical lab and imaging results will be communicated to you by MyChart, letter or phone within 4 business days after the clinic has received the results. If you do not hear from us within 7 days, please contact the clinic through Picreelhart or phone. If you have a critical or abnormal lab result, we will notify you by phone as soon as possible.  Submit refill requests through All Protector Agency or call your pharmacy and they will forward the refill request to us. Please allow 3 business days for your refill to be completed.          Additional Information About Your Visit        Picreelhart Information     All Protector Agency lets you send messages to your doctor, view your test results, renew your prescriptions, schedule appointments and more. To sign up, go to [email or web address] . Click on \"Log in\" on the left side of the screen, which will take you to the Welcome page. Then click on \"Sign up Now\" on the right side of the page.     You will be asked to enter the access code listed below, as well as some personal information. Please follow the " directions to create your username and password.     Your access code is: [email or web address]  Expires: 2018  9:41 AM     Your access code will  in 90 days. If you need help or a new code, please call your [place] clinic or [phone].        Care EveryWhere ID     This is your Care EveryWhere ID. This could be used by other organizations to access your [place] medical records  [id number]        Your Vitals Were     Pulse Respirations                76 14           Blood Pressure from Last 3 Encounters:   18 138/74   18 167/89   02/15/18 (!) 160/104    Weight from Last 3 Encounters:   No data found for Wt              We Performed the Following     MEASURE POST-VOID RESIDUAL URINE/BLADDER CAPACITY, US NON-IMAGING     UA reflex to Microscopic     Urine Microscopic          Today's Medication Changes          These changes are accurate as of 18 10:37 AM.  If you have any questions, ask your nurse or doctor.               Start taking these medicines.        Dose/Directions    nitroFURantoin (macrocrystal-monohydrate) 100 MG capsule   Commonly known as:  MACROBID   Used for:  Mixed incontinence   Started by:  Osmin Guzman MD        Dose:  100 mg   Take 1 capsule (100 mg) by mouth 2 times daily for 5 days   Quantity:  10 capsule   Refills:  0         Stop taking these medicines if you haven't already. Please contact your care team if you have questions.     ciprofloxacin 500 MG tablet   Commonly known as:  CIPRO   Stopped by:  Osmin Guzman MD                Where to get your medicines      These medications were sent to [facility] [address] [place] [address] AT [facility] [address] [address], [facility] [address]    Hours:  24-hours Phone:  [phone]     nitroFURantoin (macrocrystal-monohydrate) 100 MG capsule                Primary Care Provider Office Phone # Fax #    Kristie Bowen PA-C [phone]       [facility]  [address] [address]        Equal Access to Services     NEGRITA ANDRADECINDY : Hadii gato oconnor lisandroo Sokeshiaali, waaxda luqadaha, qaybta kaalmada annejeff, suad sterling bhavnamckenzie rodríguezrodrigo fridapablito katie mendieta. So [facility] [phone].    ATENCIÓN: Si habla español, tiene a michaud disposición servicios gratuitos de asistencia lingüística. Llame al [phone].    We comply with applicable federal civil rights laws and [place] laws. We do not discriminate on the basis of race, color, national origin, age, disability, sex, sexual orientation, or gender identity.            Thank you!     Thank you for choosing [facility]  for your care. Our goal is always to provide you with excellent care. Hearing back from our patients is one way we can continue to improve our services. Please take a few minutes to complete the written survey that you may receive in the mail after your visit with us. Thank you!             Your Updated Medication List - Protect others around you: Learn how to safely use, store and throw away your medicines at www.disposemymeds.org.          This list is accurate as of 9/26/18 10:37 AM.  Always use your most recent med list.                   Brand Name Dispense Instructions for use Diagnosis    ASPIRIN PO      Take 81 mg by mouth        BOTOX 100 units injection   Generic drug:  botulinum toxin type A      Inject 100 Units into the muscle once        cetirizine 10 MG tablet    zyrTEC     Take 10 mg by mouth        conjugated estrogens cream    PREMARIN    30 g    Place 0.5 g vaginally three times a week    Atrophic vaginitis       estradiol 0.1 MG/GM cream    ESTRACE VAGINAL    0.5 g    Place 2 g vaginally three times a week    Atrophic vaginitis       LOSARTAN POTASSIUM PO      Take 25 mg by mouth daily        nitroFURantoin (macrocrystal-monohydrate) 100 MG capsule    MACROBID    10 capsule    Take 1 capsule (100 mg) by mouth 2 times daily for 5 days    Mixed incontinence        SIMVASTATIN PO      Take 20 mg by mouth At Bedtime        triamcinolone 55 MCG/ACT inhaler    NASACORT     Spray 2 sprays into both nostrils        VITAMIN D (CHOLECALCIFEROL) PO      Take 1,000 Units by mouth daily

## 2018-09-26 NOTE — PROGRESS NOTES
"F/u OAB, s/p Botox #6 on 9/12/18; Estrace.       Feels as though she occasionally gets a \"tingle\" at her urethra. Specifically denies dysuria, gross hematuria, frequency, systemic toxicity.     Still wears med pad day and nite for urge incont.     Intermittently compliant with Estrace cream.      Current Outpatient Prescriptions   Medication     ASPIRIN PO     botulinum toxin type A (BOTOX) 100 UNITS injection     ciprofloxacin (CIPRO) 500 MG tablet     conjugated estrogens (PREMARIN) cream     estradiol (ESTRACE VAGINAL) 0.1 MG/GM cream     LOSARTAN POTASSIUM PO     SIMVASTATIN PO     triamcinolone (NASACORT) 55 MCG/ACT nasal inhaler     VITAMIN D, CHOLECALCIFEROL, PO     No current facility-administered medications for this visit.        From 9/12/18:    Results for orders placed or performed in visit on 09/12/18   UA reflex to Microscopic   Result Value Ref Range    Color Urine Yellow     Appearance Urine Clear     Glucose Urine Negative NEG^Negative mg/dL    Bilirubin Urine Negative NEG^Negative    Ketones Urine Negative NEG^Negative mg/dL    Specific Gravity Urine 1.010 1.003 - 1.035    Blood Urine Trace (A) NEG^Negative    pH Urine 7.0 5.0 - 7.0 pH    Protein Albumin Urine Negative NEG^Negative mg/dL    Urobilinogen Urine 0.2 0.2 - 1.0 EU/dL    Nitrite Urine Positive (A) NEG^Negative    Leukocyte Esterase Urine Small (A) NEG^Negative    Source Midstream Urine    Urine Microscopic   Result Value Ref Range    WBC Urine 5-10 (A) OTO5^0 - 5 /HPF    RBC Urine 2-5 (A) OTO2^O - 2 /HPF    Bacteria Urine Many (A) NEG^Negative /HPF   Urine Culture Aerobic Bacterial   Result Value Ref Range    Specimen Description Midstream Urine     Culture Micro 50,000 to 100,000 colonies/mL  Escherichia coli   (A)     Culture Micro (A)      50,000 to 100,000 colonies/mL  Strain 2  Escherichia coli         Susceptibility    Escherichia coli - JOSIAS     AMPICILLIN >=32 Resistant ug/mL     CEFAZOLIN* <=4 Sensitive ug/mL      * Cefazolin " JOSIAS breakpoints are for the treatment of uncomplicated urinary tract infections.  For the treatment of systemic infections, please contact the laboratory for additional testing.Cefazolin JOSIAS breakpoints are for the treatment of uncomplicated urinary tract infections.  For the treatment of systemic infections, please contact the laboratory for additional testing.     CEFOXITIN <=4 Sensitive ug/mL     CEFTAZIDIME <=1 Sensitive ug/mL     CEFTRIAXONE <=1 Sensitive ug/mL     CIPROFLOXACIN >=4 Resistant ug/mL     GENTAMICIN <=1 Sensitive ug/mL     LEVOFLOXACIN >=8 Resistant ug/mL     NITROFURANTOIN <=16 Sensitive ug/mL     TOBRAMYCIN <=1 Sensitive ug/mL     Trimethoprim/Sulfa >=16/304 Resistant ug/mL     AMPICILLIN/SULBACTAM >=32 Resistant ug/mL     Piperacillin/Tazo <=4 Sensitive ug/mL     CEFEPIME <=1 Sensitive ug/mL    Escherichia coli - JOSIAS     AMPICILLIN >=32 Resistant ug/mL     CEFAZOLIN* <=4 Sensitive ug/mL      * Cefazolin JOSIAS breakpoints are for the treatment of uncomplicated urinary tract infections.  For the treatment of systemic infections, please contact the laboratory for additional testing.     CEFOXITIN <=4 Sensitive ug/mL     CEFTAZIDIME <=1 Sensitive ug/mL     CEFTRIAXONE <=1 Sensitive ug/mL     CIPROFLOXACIN >=4 Resistant ug/mL     GENTAMICIN 2 Sensitive ug/mL     LEVOFLOXACIN >=8 Resistant ug/mL     NITROFURANTOIN <=16 Sensitive ug/mL     TOBRAMYCIN <=1 Sensitive ug/mL     Trimethoprim/Sulfa >=16/304 Resistant ug/mL     AMPICILLIN/SULBACTAM 16 Intermediate ug/mL     Piperacillin/Tazo <=4 Sensitive ug/mL     CEFEPIME <=1 Sensitive ug/mL           Today:    Results for orders placed or performed in visit on 09/26/18   UA reflex to Microscopic   Result Value Ref Range    Color Urine Yellow     Appearance Urine Clear     Glucose Urine 100 (A) NEG^Negative mg/dL    Bilirubin Urine Negative NEG^Negative    Ketones Urine Negative NEG^Negative mg/dL    Specific Gravity Urine 1.015 1.003 - 1.035    Blood Urine  Trace (A) NEG^Negative    pH Urine 5.5 5.0 - 7.0 pH    Protein Albumin Urine Negative NEG^Negative mg/dL    Urobilinogen Urine 0.2 0.2 - 1.0 EU/dL    Nitrite Urine Positive (A) NEG^Negative    Leukocyte Esterase Urine Trace (A) NEG^Negative    Source Midstream Urine        PVR: 132 cc      IMP:  1. OAB, stable  2. Probable UTI  3. Atrophic vaginitis      PLAN:  1. Discussed situation with patient in detail.  2. UC; emperic Macrobid  3. Pt will increase compliance with Estrace  4. RTC 2 wks to recheck UA  5. 15 minutes spent with patient, 100% spent in counseling and coordination of care for OAB/UTI.  6. Copy

## 2018-09-27 ENCOUNTER — TELEPHONE (OUTPATIENT)
Dept: UROLOGY | Facility: CLINIC | Age: 78
End: 2018-09-27

## 2018-09-27 NOTE — TELEPHONE ENCOUNTER
Pt developed cold sweats last night and has had a cough for the past week  Pt is unsure if symptoms are r/t to Macrobid as she has taken it in the past w/no reaction  She denies chills/diaphoresis today and took the abx this morning  Advised her to keep us informed if her symptoms persist  Recommended she take a probiotic with the antibiotic  Pt verbalized understanding and has no further questions    Toy Jennings RN....9/27/2018 10:22 AM

## 2018-09-27 NOTE — TELEPHONE ENCOUNTER
Reason for call:  Patient reporting a symptom    Symptom or request: Vomiting/sick/chills    Duration (how long have symptoms been present): since yesterday    Have you been treated for this before? No    Additional comments: Is this from the medication prescribed yesterday. Please call back.    Phone Number patient can be reached at:  Home number on file [phone] (home)    Best Time:  ASAP    Can we leave a detailed message on this number:  YES    Call taken on 9/27/2018 at 8:51 AM by Kristan Yeager

## 2018-09-28 LAB
BACTERIA SPEC CULT: ABNORMAL
SPECIMEN SOURCE: ABNORMAL

## 2018-10-11 ENCOUNTER — OFFICE VISIT (OUTPATIENT)
Dept: UROLOGY | Facility: CLINIC | Age: 78
End: 2018-10-11
Payer: COMMERCIAL

## 2018-10-11 VITALS — SYSTOLIC BLOOD PRESSURE: 136 MMHG | HEART RATE: 82 BPM | RESPIRATION RATE: 14 BRPM | DIASTOLIC BLOOD PRESSURE: 86 MMHG

## 2018-10-11 DIAGNOSIS — N39.46 MIXED INCONTINENCE: Primary | ICD-10-CM

## 2018-10-11 DIAGNOSIS — R30.0 DYSURIA: ICD-10-CM

## 2018-10-11 LAB
ALBUMIN UR-MCNC: NEGATIVE MG/DL
AMORPH CRY #/AREA URNS HPF: ABNORMAL /HPF
APPEARANCE UR: ABNORMAL
BACTERIA #/AREA URNS HPF: ABNORMAL /HPF
BILIRUB UR QL STRIP: NEGATIVE
COLOR UR AUTO: YELLOW
GLUCOSE UR STRIP-MCNC: 100 MG/DL
HGB UR QL STRIP: ABNORMAL
KETONES UR STRIP-MCNC: NEGATIVE MG/DL
LEUKOCYTE ESTERASE UR QL STRIP: ABNORMAL
NITRATE UR QL: POSITIVE
NON-SQ EPI CELLS #/AREA URNS LPF: ABNORMAL /LPF
PH UR STRIP: 5 PH (ref 5–7)
RBC #/AREA URNS AUTO: ABNORMAL /HPF
SOURCE: ABNORMAL
SP GR UR STRIP: <=1.005 (ref 1–1.03)
UROBILINOGEN UR STRIP-ACNC: 0.2 EU/DL (ref 0.2–1)
WBC #/AREA URNS AUTO: ABNORMAL /HPF

## 2018-10-11 PROCEDURE — 81001 URINALYSIS AUTO W/SCOPE: CPT | Performed by: UROLOGY

## 2018-10-11 PROCEDURE — 51798 US URINE CAPACITY MEASURE: CPT | Performed by: UROLOGY

## 2018-10-11 PROCEDURE — 99213 OFFICE O/P EST LOW 20 MIN: CPT | Mod: 25 | Performed by: UROLOGY

## 2018-10-11 RX ORDER — NITROFURANTOIN 25; 75 MG/1; MG/1
100 CAPSULE ORAL 2 TIMES DAILY
Qty: 14 CAPSULE | Refills: 0 | Status: SHIPPED | OUTPATIENT
Start: 2018-10-11 | End: 2019-03-07

## 2018-10-11 NOTE — PROGRESS NOTES
F/u OAB, s/p Botox #6 on 9/12/18; Estrace.     Not sure if she is emptying to completion.    Denies dysuria, gross hematuria, frequency. Noc x 2, dry. Wears 2-3 med pads per day.    Compliant with Estrace cream (now that she has marked her pill box)      Current Outpatient Prescriptions   Medication     ASPIRIN PO     botulinum toxin type A (BOTOX) 100 UNITS injection     cetirizine (ZYRTEC) 10 MG tablet     conjugated estrogens (PREMARIN) cream     LOSARTAN POTASSIUM PO     SIMVASTATIN PO     triamcinolone (NASACORT) 55 MCG/ACT nasal inhaler     VITAMIN D, CHOLECALCIFEROL, PO     No current facility-administered medications for this visit.        UC: 9/26/18: E coli, sens macro (resistant to Levo and Bactrim)    Today:  Results for orders placed or performed in visit on 10/11/18   UA reflex to Microscopic   Result Value Ref Range    Color Urine Yellow     Appearance Urine Slightly Cloudy     Glucose Urine 100 (A) NEG^Negative mg/dL    Bilirubin Urine Negative NEG^Negative    Ketones Urine Negative NEG^Negative mg/dL    Specific Gravity Urine <=1.005 1.003 - 1.035    Blood Urine Trace (A) NEG^Negative    pH Urine 5.0 5.0 - 7.0 pH    Protein Albumin Urine Negative NEG^Negative mg/dL    Urobilinogen Urine 0.2 0.2 - 1.0 EU/dL    Nitrite Urine Positive (A) NEG^Negative    Leukocyte Esterase Urine Large (A) NEG^Negative    Source Midstream Urine    Urine Microscopic   Result Value Ref Range    WBC Urine  (A) OTO5^0 - 5 /HPF    RBC Urine 2-5 (A) OTO2^O - 2 /HPF    Squamous Epithelial /LPF Urine Few FEW^Few /LPF    Bacteria Urine Moderate (A) NEG^Negative /HPF    Amorphous Crystals Moderate (A) NEG^Negative /HPF       PVR: 210 ml        IMP:  1. Probable UTI  2. OAB, improving  3. Atrophic vaginitis      PLAN:  1. Discussed situation with patient in detail.  2. UC; emperic Macrobid x 1 week  3. Encouraged continuing compliance with Estrace  4. RTC 1 mo  5. 15 minutes spent with patient, more than 50% spent in  counseling and coordination of care for UTI.

## 2018-10-11 NOTE — MR AVS SNAPSHOT
"              After Visit Summary   10/11/2018    Paulo Giron    MRN: [id number]           Patient Information     Date Of Birth          1940        Visit Information        Provider Department      10/11/2018 10:15 AM Osmin Guzman MD [facility]        Today's Diagnoses     Mixed incontinence    -  1    Dysuria           Follow-ups after your visit        Follow-up notes from your care team     Return in about 1 month (around 11/11/2018).      Your next 10 appointments already scheduled     Nov 14, 2018 11:15 AM CST   Return Visit with Osmin Guzman MD   [facility])    [address] [place] [id number] [phone]              Who to contact     If you have questions or need follow up information about today's clinic visit or your schedule please contact [facility] directly at [phone].  Normal or non-critical lab and imaging results will be communicated to you by MyChart, letter or phone within 4 business days after the clinic has received the results. If you do not hear from us within 7 days, please contact the clinic through [employer] or phone. If you have a critical or abnormal lab result, we will notify you by phone as soon as possible.  Submit refill requests through [employer] or call your pharmacy and they will forward the refill request to us. Please allow 3 business days for your refill to be completed.          Additional Information About Your Visit        [employer] Online Information     [employer] lets you send messages to your doctor, view your test results, renew your prescriptions, schedule appointments and more. To sign up, go to [email or web address] . Click on \"Log in\" on the left side of the screen, which will take you to the Welcome page. Then click on \"Sign up Now\" on the right side of the page.     You will be asked to enter the access code listed below, as well as some personal information. " Please follow the directions to create your username and password.     Your access code is: [email or web address]  Expires: 2018  9:41 AM     Your access code will  in 90 days. If you need help or a new code, please call your [place] clinic or [phone].        Care EveryWhere ID     This is your Care EveryWhere ID. This could be used by other organizations to access your [facility] medical records  [id number]        Your Vitals Were     Pulse Respirations                82 14           Blood Pressure from Last 3 Encounters:   10/11/18 136/86   18 138/74   18 167/89    Weight from Last 3 Encounters:   No data found for Wt              We Performed the Following     MEASURE POST-VOID RESIDUAL URINE/BLADDER CAPACITY, US NON-IMAGING     UA reflex to Microscopic     Urine Microscopic          Today's Medication Changes          These changes are accurate as of 10/11/18 10:48 AM.  If you have any questions, ask your nurse or doctor.               Start taking these medicines.        Dose/Directions    nitroFURantoin (macrocrystal-monohydrate) 100 MG capsule   Commonly known as:  MACROBID   Used for:  Dysuria   Started by:  Osmin Guzman MD        Dose:  100 mg   Take 1 capsule (100 mg) by mouth 2 times daily for 7 days   Quantity:  14 capsule   Refills:  0            Where to get your medicines      These medications were sent to [facility] [id number] [place] [address] AT [facility] [address], [facility] [address]    Hours:  24-hours Phone:  [phone]     nitroFURantoin (macrocrystal-monohydrate) 100 MG capsule                Primary Care Provider Office Phone # Fax #    Kristie Bowen PA-C [phone]       [facility] [address]  [facility] [address]        Equal Access to Services     NICOLAS SCHUSTER AH: Raymond Gutierrez, waaxda luqadaha, qaybta kaalmakaylee negrete, suad renner  latomas mendieta. So [facility] [phone].    ATENCIÓN: Si habla bogdan, tiene a michaud disposición servicios gratuitos de asistencia lingüística. Raiza al [phone].    We comply with applicable federal civil rights laws and [place] laws. We do not discriminate on the basis of race, color, national origin, age, disability, sex, sexual orientation, or gender identity.            Thank you!     Thank you for choosing [facility]  for your care. Our goal is always to provide you with excellent care. Hearing back from our patients is one way we can continue to improve our services. Please take a few minutes to complete the written survey that you may receive in the mail after your visit with us. Thank you!             Your Updated Medication List - Protect others around you: Learn how to safely use, store and throw away your medicines at www.disposemymeds.org.          This list is accurate as of 10/11/18 10:48 AM.  Always use your most recent med list.                   Brand Name Dispense Instructions for use Diagnosis    ASPIRIN PO      Take 81 mg by mouth        BOTOX 100 units injection   Generic drug:  botulinum toxin type A      Inject 100 Units into the muscle once        cetirizine 10 MG tablet    zyrTEC     Take 10 mg by mouth        conjugated estrogens cream    PREMARIN    30 g    Place 0.5 g vaginally three times a week    Atrophic vaginitis       LOSARTAN POTASSIUM PO      Take 25 mg by mouth daily        nitroFURantoin (macrocrystal-monohydrate) 100 MG capsule    MACROBID    14 capsule    Take 1 capsule (100 mg) by mouth 2 times daily for 7 days    Dysuria       SIMVASTATIN PO      Take 20 mg by mouth At Bedtime        triamcinolone 55 MCG/ACT inhaler    NASACORT     Spray 2 sprays into both nostrils        VITAMIN D (CHOLECALCIFEROL) PO      Take 1,000 Units by mouth daily

## 2018-10-29 ENCOUNTER — TELEPHONE (OUTPATIENT)
Dept: UROLOGY | Facility: CLINIC | Age: 78
End: 2018-10-29

## 2018-10-29 NOTE — TELEPHONE ENCOUNTER
Reason for Call:  Request for results:    Name of test or procedure: Urin Culture     Date of test of procedure: 10/11    Location of the test or procedure: [place]     OK to leave the result message on voice mail or with a family member? YES    Phone number Patient can be reached at:  Home number on file [phone] (home)    Additional comments: none     Call taken on 10/29/2018 at 10:02 AM by Ritchie Florian

## 2018-10-29 NOTE — TELEPHONE ENCOUNTER
Called and spoke to patient.   Advised that a urine culture was not ordered 10/11/2018.   Patient denies urinary symptoms at this time and agrees to follow up as planned.   Nayeli Villeda RN

## 2018-11-14 ENCOUNTER — OFFICE VISIT (OUTPATIENT)
Dept: UROLOGY | Facility: CLINIC | Age: 78
End: 2018-11-14
Payer: COMMERCIAL

## 2018-11-14 VITALS — OXYGEN SATURATION: 97 % | SYSTOLIC BLOOD PRESSURE: 152 MMHG | DIASTOLIC BLOOD PRESSURE: 95 MMHG | HEART RATE: 74 BPM

## 2018-11-14 DIAGNOSIS — N39.46 MIXED INCONTINENCE: Primary | ICD-10-CM

## 2018-11-14 DIAGNOSIS — N39.0 URINARY TRACT INFECTION: ICD-10-CM

## 2018-11-14 DIAGNOSIS — R82.90 NONSPECIFIC FINDING ON EXAMINATION OF URINE: ICD-10-CM

## 2018-11-14 LAB
ALBUMIN UR-MCNC: NEGATIVE MG/DL
APPEARANCE UR: CLEAR
BACTERIA #/AREA URNS HPF: ABNORMAL /HPF
BILIRUB UR QL STRIP: NEGATIVE
COLOR UR AUTO: YELLOW
GLUCOSE UR STRIP-MCNC: NEGATIVE MG/DL
HGB UR QL STRIP: ABNORMAL
KETONES UR STRIP-MCNC: NEGATIVE MG/DL
LEUKOCYTE ESTERASE UR QL STRIP: ABNORMAL
NITRATE UR QL: POSITIVE
PH UR STRIP: 5 PH (ref 5–7)
RBC #/AREA URNS AUTO: ABNORMAL /HPF
SOURCE: ABNORMAL
SP GR UR STRIP: 1.01 (ref 1–1.03)
UROBILINOGEN UR STRIP-ACNC: 0.2 EU/DL (ref 0.2–1)
WBC #/AREA URNS AUTO: ABNORMAL /HPF

## 2018-11-14 PROCEDURE — 99214 OFFICE O/P EST MOD 30 MIN: CPT | Mod: 25 | Performed by: UROLOGY

## 2018-11-14 PROCEDURE — 87086 URINE CULTURE/COLONY COUNT: CPT | Performed by: UROLOGY

## 2018-11-14 PROCEDURE — 51798 US URINE CAPACITY MEASURE: CPT | Performed by: UROLOGY

## 2018-11-14 PROCEDURE — 87186 SC STD MICRODIL/AGAR DIL: CPT | Performed by: UROLOGY

## 2018-11-14 PROCEDURE — 81001 URINALYSIS AUTO W/SCOPE: CPT | Performed by: UROLOGY

## 2018-11-14 PROCEDURE — 87088 URINE BACTERIA CULTURE: CPT | Mod: 59 | Performed by: UROLOGY

## 2018-11-14 RX ORDER — METFORMIN HYDROCHLORIDE 750 MG/1
TABLET, EXTENDED RELEASE ORAL
Refills: 1 | COMMUNITY
Start: 2018-11-12 | End: 2024-03-04

## 2018-11-14 NOTE — PROGRESS NOTES
"F/u OAB, s/p Botox #6 on 9/12/18; Estrace.       Better \"but still not perfect.\"     Wears one medium pad per day and one per nite, moist.     Denies dysuria, gross hematuria, frequency. Nocturia x 1.    Presently struggling with sugar control; was 260 yesterday >> started on Metformin. Struggles with food choices and getting exercise done.      Current Outpatient Prescriptions   Medication     ASPIRIN PO     botulinum toxin type A (BOTOX) 100 UNITS injection     cetirizine (ZYRTEC) 10 MG tablet     conjugated estrogens (PREMARIN) cream     LOSARTAN POTASSIUM PO     metFORMIN (GLUCOPHAGE-XR) 750 MG 24 hr tablet     SIMVASTATIN PO     triamcinolone (NASACORT) 55 MCG/ACT nasal inhaler     VITAMIN D, CHOLECALCIFEROL, PO     No current facility-administered medications for this visit.          Results for orders placed or performed in visit on 11/14/18   UA reflex to Microscopic   Result Value Ref Range    Color Urine Yellow     Appearance Urine Clear     Glucose Urine Negative NEG^Negative mg/dL    Bilirubin Urine Negative NEG^Negative    Ketones Urine Negative NEG^Negative mg/dL    Specific Gravity Urine 1.015 1.003 - 1.035    Blood Urine Trace (A) NEG^Negative    pH Urine 5.0 5.0 - 7.0 pH    Protein Albumin Urine Negative NEG^Negative mg/dL    Urobilinogen Urine 0.2 0.2 - 1.0 EU/dL    Nitrite Urine Positive (A) NEG^Negative    Leukocyte Esterase Urine Small (A) NEG^Negative    Source Midstream Urine    Urine Microscopic   Result Value Ref Range    WBC Urine 5-10 (A) OTO5^0 - 5 /HPF    RBC Urine O - 2 OTO2^O - 2 /HPF    Bacteria Urine Many (A) NEG^Negative /HPF     PVR: 130 cc      IMP:  1. OAB, improving but still some room for improvement  2. DM with suboptimal control      PLAN:  1. Discussed situation with patient in detail.  2. Stressed importance of sugar control on bladder control; pt expresses understanding  3. RTC in March for next Botox or sooner prn  4. 25 minutes spent with patient, 100% spent in " counseling and coordination of care for OAB.

## 2018-11-14 NOTE — MR AVS SNAPSHOT
After Visit Summary   11/14/2018    Paulo Giron    MRN: [id number]           Patient Information     Date Of Birth          1940        Visit Information        Provider Department      11/14/2018 11:15 AM Osmin Guzman MD [facility]        Today's Diagnoses     Mixed incontinence    -  1    Nonspecific finding on examination of urine           Follow-ups after your visit        Your next 10 appointments already scheduled     Mar 07, 2019 10:00 AM CST   Return Visit with Osmin Guzman MD, [facility]   [facility] ([facility])    [address] [place] [id number] [phone]              Who to contact     If you have questions or need follow up information about today's clinic visit or your schedule please contact [facility] directly at [phone].  Normal or non-critical lab and imaging results will be communicated to you by MyChart, letter or phone within 4 business days after the clinic has received the results. If you do not hear from us within 7 days, please contact the clinic through MyChart or phone. If you have a critical or abnormal lab result, we will notify you by phone as soon as possible.  Submit refill requests through [employer] or call your pharmacy and they will forward the refill request to us. Please allow 3 business days for your refill to be completed.          Additional Information About Your Visit        MyChart Information     [employer] gives you secure access to your electronic health record. If you see a primary care provider, you can also send messages to your care team and make appointments. If you have questions, please call your primary care clinic.  If you do not have a primary care provider, please call [phone] and they will assist you.        Care EveryWhere ID     This is your Care EveryWhere ID. This could be used by other organizations to access your [facility]  medical records  [id number]        Your Vitals Were     Pulse Pulse Oximetry                74 97%           Blood Pressure from Last 3 Encounters:   11/14/18 (!) 152/95   10/11/18 136/86   09/26/18 138/74    Weight from Last 3 Encounters:   No data found for Wt              We Performed the Following     MEASURE POST-VOID RESIDUAL URINE/BLADDER CAPACITY, US NON-IMAGING ([id number])     UA reflex to Microscopic     Urine Culture Aerobic Bacterial     Urine Microscopic        Primary Care Provider Office Phone # Fax #    Kristie Bowen PA-C [phone]       [facility] [place] [address]  [place] [address]        Equal Access to Services     [facility] : Hadii aad ku hadasho Soomaali, waaxda luqadaha, qaybta kaalmada adeegyada, waxfatou wilson . So [facility] [phone].    ATENCIÓN: Si habla español, tiene a michaud disposición servicios gratuitos de asistencia lingüística. [facility] [phone].    We comply with applicable federal civil rights laws and [place] laws. We do not discriminate on the basis of race, color, national origin, age, disability, sex, sexual orientation, or gender identity.            Thank you!     [facility] you [facility]  [facility] your [facility]. Our goal is always to provide you with excellent care. Hearing back from our patients is one way we can continue to improve our services. Please take a few minutes to complete the written survey that you may receive in the mail after your visit with us. Thank you!             Your Updated Medication List - Protect others around you: Learn how to safely use, store and throw away your medicines at www.disposemymeds.org.          This list is accurate as of 11/14/18  3:17 PM.  Always use your most recent med list.                   Brand Name Dispense Instructions for use Diagnosis    ASPIRIN PO      Take 81 mg by mouth        BOTOX 100 units injection   Generic drug:  botulinum toxin type A       Inject 100 Units into the muscle once        cetirizine 10 MG tablet    zyrTEC     Take 10 mg by mouth        conjugated estrogens cream    PREMARIN    30 g    Place 0.5 g vaginally three times a week    Atrophic vaginitis       LOSARTAN POTASSIUM PO      Take 25 mg by mouth daily        metFORMIN 750 MG 24 hr tablet    GLUCOPHAGE-XR     TK 2 TS PO QD    Mixed incontinence       SIMVASTATIN PO      Take 20 mg by mouth At Bedtime        triamcinolone 55 MCG/ACT inhaler    NASACORT     Spray 2 sprays into both nostrils        VITAMIN D (CHOLECALCIFEROL) PO      Take 1,000 Units by mouth daily

## 2018-11-17 LAB
BACTERIA SPEC CULT: ABNORMAL
BACTERIA SPEC CULT: ABNORMAL
SPECIMEN SOURCE: ABNORMAL

## 2018-11-26 ENCOUNTER — MYC MEDICAL ADVICE (OUTPATIENT)
Dept: UROLOGY | Facility: CLINIC | Age: 78
End: 2018-11-26

## 2018-11-26 DIAGNOSIS — Z87.440 PERSONAL HISTORY OF URINARY TRACT INFECTION: Primary | ICD-10-CM

## 2018-11-29 DIAGNOSIS — Z87.440 PERSONAL HISTORY OF URINARY TRACT INFECTION: ICD-10-CM

## 2018-11-29 DIAGNOSIS — R82.90 NONSPECIFIC FINDING ON EXAMINATION OF URINE: ICD-10-CM

## 2018-11-29 DIAGNOSIS — N39.0 URINARY TRACT INFECTION: ICD-10-CM

## 2018-11-29 DIAGNOSIS — N39.46 MIXED INCONTINENCE: ICD-10-CM

## 2018-11-29 PROCEDURE — 81001 URINALYSIS AUTO W/SCOPE: CPT | Performed by: UROLOGY

## 2018-11-29 PROCEDURE — 87086 URINE CULTURE/COLONY COUNT: CPT | Performed by: UROLOGY

## 2018-11-29 PROCEDURE — 87088 URINE BACTERIA CULTURE: CPT | Performed by: UROLOGY

## 2018-11-29 PROCEDURE — 87186 SC STD MICRODIL/AGAR DIL: CPT | Performed by: UROLOGY

## 2018-12-01 LAB
BACTERIA SPEC CULT: ABNORMAL
SPECIMEN SOURCE: ABNORMAL

## 2018-12-03 NOTE — PROGRESS NOTES
UC pos for aerococcus, resistant to bactrim, macrobid and levo; will restart Keflex 250 QID for 5 d

## 2019-02-13 ENCOUNTER — TELEPHONE (OUTPATIENT)
Dept: UROLOGY | Facility: CLINIC | Age: 79
End: 2019-02-13

## 2019-02-13 NOTE — TELEPHONE ENCOUNTER
Surgery Pre-Certification    Medical Record Number: [id number]  Paulo Giron  YOB: 1940   Phone: [phone] (home)   Primary Provider: Kristie Bowen    Diagnosis:  OAB    Surgeon: esteban  Surgical Procedure: cysto botox  BMI:  na  ICD-10 Coded: N32.81  Hospital: in clinic  In-Patient/ Out-Patient status: Outpatient  Length of surgery: 20 min  Anesthesia: local  Implanted Cardiac Device: N/A    Date of Surgery:  3/7/2019  When post-op appointment needed:  2 weeks  Special Requests/ Equipment:: botox  CPM Needed: na  Requestor:  Ameena Phillips CMA    Patient has [employer]. Follows Medicare guidelines. No prior auth required.

## 2019-03-07 ENCOUNTER — OFFICE VISIT (OUTPATIENT)
Dept: UROLOGY | Facility: CLINIC | Age: 79
End: 2019-03-07
Payer: COMMERCIAL

## 2019-03-07 ENCOUNTER — TELEPHONE (OUTPATIENT)
Dept: UROLOGY | Facility: CLINIC | Age: 79
End: 2019-03-07

## 2019-03-07 VITALS — HEART RATE: 70 BPM | SYSTOLIC BLOOD PRESSURE: 136 MMHG | RESPIRATION RATE: 14 BRPM | DIASTOLIC BLOOD PRESSURE: 84 MMHG

## 2019-03-07 DIAGNOSIS — N32.81 OAB (OVERACTIVE BLADDER): Primary | ICD-10-CM

## 2019-03-07 DIAGNOSIS — R82.90 NONSPECIFIC FINDING ON EXAMINATION OF URINE: ICD-10-CM

## 2019-03-07 LAB
ALBUMIN UR-MCNC: NEGATIVE MG/DL
APPEARANCE UR: ABNORMAL
BACTERIA #/AREA URNS HPF: ABNORMAL /HPF
BILIRUB UR QL STRIP: NEGATIVE
COLOR UR AUTO: YELLOW
GLUCOSE UR STRIP-MCNC: NEGATIVE MG/DL
HGB UR QL STRIP: ABNORMAL
KETONES UR STRIP-MCNC: ABNORMAL MG/DL
LEUKOCYTE ESTERASE UR QL STRIP: ABNORMAL
MUCOUS THREADS #/AREA URNS LPF: PRESENT /LPF
NITRATE UR QL: POSITIVE
NON-SQ EPI CELLS #/AREA URNS LPF: ABNORMAL /LPF
PH UR STRIP: 5.5 PH (ref 5–7)
RBC #/AREA URNS AUTO: ABNORMAL /HPF
SOURCE: ABNORMAL
SP GR UR STRIP: 1.02 (ref 1–1.03)
UROBILINOGEN UR STRIP-ACNC: 0.2 EU/DL (ref 0.2–1)
WBC #/AREA URNS AUTO: ABNORMAL /HPF

## 2019-03-07 PROCEDURE — 87086 URINE CULTURE/COLONY COUNT: CPT | Performed by: UROLOGY

## 2019-03-07 PROCEDURE — 99213 OFFICE O/P EST LOW 20 MIN: CPT | Performed by: UROLOGY

## 2019-03-07 PROCEDURE — 87186 SC STD MICRODIL/AGAR DIL: CPT | Performed by: UROLOGY

## 2019-03-07 PROCEDURE — 87088 URINE BACTERIA CULTURE: CPT | Performed by: UROLOGY

## 2019-03-07 PROCEDURE — 81001 URINALYSIS AUTO W/SCOPE: CPT | Performed by: UROLOGY

## 2019-03-07 RX ORDER — NITROFURANTOIN 25; 75 MG/1; MG/1
100 CAPSULE ORAL 2 TIMES DAILY
Qty: 10 CAPSULE | Refills: 0 | Status: SHIPPED | OUTPATIENT
Start: 2019-03-07 | End: 2019-03-28

## 2019-03-07 NOTE — PROGRESS NOTES
F/u OAB, here for Botox #7, Estrace.     Denies dysuria, gross hematuria; requesting next Botox.      Current Outpatient Medications   Medication     ASPIRIN PO     botulinum toxin type A (BOTOX) 100 UNITS injection     cetirizine (ZYRTEC) 10 MG tablet     conjugated estrogens (PREMARIN) cream     LOSARTAN POTASSIUM PO     metFORMIN (GLUCOPHAGE-XR) 750 MG 24 hr tablet     SIMVASTATIN PO     triamcinolone (NASACORT) 55 MCG/ACT nasal inhaler     VITAMIN D, CHOLECALCIFEROL, PO     No current facility-administered medications for this visit.        Results for orders placed or performed in visit on 03/07/19   UA reflex to Microscopic   Result Value Ref Range    Color Urine Yellow     Appearance Urine Slightly Cloudy     Glucose Urine Negative NEG^Negative mg/dL    Bilirubin Urine Negative NEG^Negative    Ketones Urine Trace (A) NEG^Negative mg/dL    Specific Gravity Urine 1.020 1.003 - 1.035    Blood Urine Small (A) NEG^Negative    pH Urine 5.5 5.0 - 7.0 pH    Protein Albumin Urine Negative NEG^Negative mg/dL    Urobilinogen Urine 0.2 0.2 - 1.0 EU/dL    Nitrite Urine Positive (A) NEG^Negative    Leukocyte Esterase Urine Small (A) NEG^Negative    Source Midstream Urine        IMP:  1. OAB  2. Probable UTI      PLAN:  1. Discussed situation with patient in detail.  2. UC; emperic Macrobid x 5 d  3. RTC 2 wks to recheck UA, likely proceed with Botox  4. 15 minutes spent with patient, more than 50% spent in counseling and coordination of care for OAB/UTI.

## 2019-03-09 LAB
BACTERIA SPEC CULT: ABNORMAL
SPECIMEN SOURCE: ABNORMAL

## 2019-03-11 ENCOUNTER — TELEPHONE (OUTPATIENT)
Dept: UROLOGY | Facility: CLINIC | Age: 79
End: 2019-03-11

## 2019-03-11 DIAGNOSIS — N39.0 URINARY TRACT INFECTION: Primary | ICD-10-CM

## 2019-03-11 NOTE — TELEPHONE ENCOUNTER
----- Message from Osmin Guzman MD sent at 3/11/2019  3:19 PM CDT -----  UC pos for E coli with significant resistance; will try Keflex 250 QID x 7 days

## 2019-03-11 NOTE — TELEPHONE ENCOUNTER
Called patient and informed her of results.  Sent prescription to [facility] in [place] per patient request.    Toy Jennings RN....3/11/2019 3:39 PM

## 2019-03-28 ENCOUNTER — TELEPHONE (OUTPATIENT)
Dept: UROLOGY | Facility: CLINIC | Age: 79
End: 2019-03-28

## 2019-03-28 ENCOUNTER — OFFICE VISIT (OUTPATIENT)
Dept: UROLOGY | Facility: CLINIC | Age: 79
End: 2019-03-28
Payer: COMMERCIAL

## 2019-03-28 VITALS — HEART RATE: 72 BPM | DIASTOLIC BLOOD PRESSURE: 88 MMHG | RESPIRATION RATE: 16 BRPM | SYSTOLIC BLOOD PRESSURE: 136 MMHG

## 2019-03-28 DIAGNOSIS — N32.81 OAB (OVERACTIVE BLADDER): Primary | ICD-10-CM

## 2019-03-28 DIAGNOSIS — R82.90 NONSPECIFIC FINDING ON EXAMINATION OF URINE: ICD-10-CM

## 2019-03-28 LAB
ALBUMIN UR-MCNC: NEGATIVE MG/DL
APPEARANCE UR: CLEAR
BACTERIA #/AREA URNS HPF: ABNORMAL /HPF
BILIRUB UR QL STRIP: NEGATIVE
COLOR UR AUTO: YELLOW
GLUCOSE UR STRIP-MCNC: NEGATIVE MG/DL
HGB UR QL STRIP: ABNORMAL
KETONES UR STRIP-MCNC: NEGATIVE MG/DL
LEUKOCYTE ESTERASE UR QL STRIP: ABNORMAL
NITRATE UR QL: POSITIVE
NON-SQ EPI CELLS #/AREA URNS LPF: ABNORMAL /LPF
PH UR STRIP: 6 PH (ref 5–7)
RBC #/AREA URNS AUTO: ABNORMAL /HPF
SOURCE: ABNORMAL
SP GR UR STRIP: <=1.005 (ref 1–1.03)
UROBILINOGEN UR STRIP-ACNC: 0.2 EU/DL (ref 0.2–1)
WBC #/AREA URNS AUTO: ABNORMAL /HPF

## 2019-03-28 PROCEDURE — 81001 URINALYSIS AUTO W/SCOPE: CPT | Performed by: UROLOGY

## 2019-03-28 PROCEDURE — 87086 URINE CULTURE/COLONY COUNT: CPT | Performed by: UROLOGY

## 2019-03-28 PROCEDURE — 87186 SC STD MICRODIL/AGAR DIL: CPT | Performed by: UROLOGY

## 2019-03-28 PROCEDURE — 87088 URINE BACTERIA CULTURE: CPT | Performed by: UROLOGY

## 2019-03-28 PROCEDURE — 99213 OFFICE O/P EST LOW 20 MIN: CPT | Performed by: UROLOGY

## 2019-03-28 NOTE — PROGRESS NOTES
Here for Botox    Denies dysuria, gross hematuria, systemic toxicity; feels well today    Took all abx as prescribed.      Current Outpatient Medications   Medication     ASPIRIN PO     botulinum toxin type A (BOTOX) 100 UNITS injection     cetirizine (ZYRTEC) 10 MG tablet     conjugated estrogens (PREMARIN) cream     LOSARTAN POTASSIUM PO     metFORMIN (GLUCOPHAGE-XR) 750 MG 24 hr tablet     SIMVASTATIN PO     triamcinolone (NASACORT) 55 MCG/ACT nasal inhaler     VITAMIN D, CHOLECALCIFEROL, PO     No current facility-administered medications for this visit.        Results for orders placed or performed in visit on 03/28/19   UA reflex to Microscopic   Result Value Ref Range    Color Urine Yellow     Appearance Urine Clear     Glucose Urine Negative NEG^Negative mg/dL    Bilirubin Urine Negative NEG^Negative    Ketones Urine Negative NEG^Negative mg/dL    Specific Gravity Urine <=1.005 1.003 - 1.035    Blood Urine Trace (A) NEG^Negative    pH Urine 6.0 5.0 - 7.0 pH    Protein Albumin Urine Negative NEG^Negative mg/dL    Urobilinogen Urine 0.2 0.2 - 1.0 EU/dL    Nitrite Urine Positive (A) NEG^Negative    Leukocyte Esterase Urine Trace (A) NEG^Negative    Source Midstream Urine        IMP:  1. OAB  2. Persistent bactiuria      PLAN:  1. Discussed situation with patient in detail, including presence of assymptomatic bacturia. In face of pending Botox will rx emperically with 7 d of Keflex and plan Botox injection promptly upon completion of course.    2. 15 minutes spent with patient, 100% spent in counseling and coordination of care for OAB/UTI.

## 2019-03-28 NOTE — TELEPHONE ENCOUNTER
Reason for call:  Request for Cephalexin Rx   Patient called regarding (reason for call): prescription  Additional comments: Patient was seen today and is currently at the pharmacy, please call soon.    Phone number to reach patient:  Other phone number:  [phone]*    Best Time:  asap    Can we leave a detailed message on this number?  YES

## 2019-03-30 LAB
BACTERIA SPEC CULT: ABNORMAL
BACTERIA SPEC CULT: ABNORMAL
SPECIMEN SOURCE: ABNORMAL

## 2019-04-04 ENCOUNTER — OFFICE VISIT (OUTPATIENT)
Dept: UROLOGY | Facility: CLINIC | Age: 79
End: 2019-04-04
Payer: COMMERCIAL

## 2019-04-04 VITALS — HEART RATE: 72 BPM | SYSTOLIC BLOOD PRESSURE: 172 MMHG | DIASTOLIC BLOOD PRESSURE: 88 MMHG | OXYGEN SATURATION: 97 %

## 2019-04-04 DIAGNOSIS — N39.46 MIXED INCONTINENCE: Primary | ICD-10-CM

## 2019-04-04 DIAGNOSIS — N32.81 OAB (OVERACTIVE BLADDER): ICD-10-CM

## 2019-04-04 LAB
ALBUMIN UR-MCNC: NEGATIVE MG/DL
APPEARANCE UR: CLEAR
BACTERIA #/AREA URNS HPF: ABNORMAL /HPF
BILIRUB UR QL STRIP: NEGATIVE
COLOR UR AUTO: YELLOW
GLUCOSE UR STRIP-MCNC: NEGATIVE MG/DL
HGB UR QL STRIP: ABNORMAL
KETONES UR STRIP-MCNC: NEGATIVE MG/DL
LEUKOCYTE ESTERASE UR QL STRIP: NEGATIVE
NITRATE UR QL: NEGATIVE
NON-SQ EPI CELLS #/AREA URNS LPF: ABNORMAL /LPF
PH UR STRIP: 5 PH (ref 5–7)
RBC #/AREA URNS AUTO: ABNORMAL /HPF
SOURCE: ABNORMAL
SP GR UR STRIP: 1.01 (ref 1–1.03)
UROBILINOGEN UR STRIP-ACNC: 0.2 EU/DL (ref 0.2–1)
WBC #/AREA URNS AUTO: ABNORMAL /HPF

## 2019-04-04 PROCEDURE — 52287 CYSTOSCOPY CHEMODENERVATION: CPT | Performed by: UROLOGY

## 2019-04-04 PROCEDURE — 81001 URINALYSIS AUTO W/SCOPE: CPT | Performed by: UROLOGY

## 2019-04-04 NOTE — PROGRESS NOTES
Here for Botox 100 U, also persistent bactiuria recent course of Keflex      Requesting next Botox; informed consent obtained. Pt took all (except the last 3) abx and has no irritative sx's today.        Results for orders placed or performed in visit on 04/04/19   UA reflex to Microscopic   Result Value Ref Range    Color Urine Yellow     Appearance Urine Clear     Glucose Urine Negative NEG^Negative mg/dL    Bilirubin Urine Negative NEG^Negative    Ketones Urine Negative NEG^Negative mg/dL    Specific Gravity Urine 1.010 1.003 - 1.035    Blood Urine Trace (A) NEG^Negative    pH Urine 5.0 5.0 - 7.0 pH    Protein Albumin Urine Negative NEG^Negative mg/dL    Urobilinogen Urine 0.2 0.2 - 1.0 EU/dL    Nitrite Urine Negative NEG^Negative    Leukocyte Esterase Urine Negative NEG^Negative    Source Midstream Urine    Urine Microscopic   Result Value Ref Range    WBC Urine 0 - 5 OTO5^0 - 5 /HPF    RBC Urine O - 2 OTO2^O - 2 /HPF    Squamous Epithelial /LPF Urine Moderate (A) FEW^Few /LPF    Bacteria Urine Few (A) NEG^Negative /HPF       Informed consent obtained. Urojet for 10 minutes, then Botox 100 units in 10 ml saline, injected into 3 sites in posterior wall; excellent blebs and hemostasis.      IMP:  1. Botox for OAB      PLAN:  1. Discussed situation with patient in detail.  2. Complete course of abx  3. Precautions  4. RTC 2 wks

## 2019-04-04 NOTE — PROGRESS NOTES
Prior to injection, verified patient identity using patient's name and date of birth.  Due to injection administration, patient instructed to remain in clinic for 15 minutes  afterwards, and to report any adverse reaction to me immediately.    botox 100u    Drug Amount Wasted:  None.  Vial/Syringe: Single dose vial  Expiration Date:  5/2021  The following medication was given:     MEDICATION: botox  ROUTE: IM  SITE: bladder  DOSE: 100u  LOT #: [id number]  :  yamini  EXPIRATION DATE:  5/2021  NDC#: 8565-2453-63

## 2019-04-17 ENCOUNTER — OFFICE VISIT (OUTPATIENT)
Dept: UROLOGY | Facility: CLINIC | Age: 79
End: 2019-04-17
Payer: COMMERCIAL

## 2019-04-17 DIAGNOSIS — N32.81 OAB (OVERACTIVE BLADDER): Primary | ICD-10-CM

## 2019-04-17 LAB
ALBUMIN UR-MCNC: NEGATIVE MG/DL
APPEARANCE UR: CLEAR
BACTERIA #/AREA URNS HPF: ABNORMAL /HPF
BILIRUB UR QL STRIP: NEGATIVE
COLOR UR AUTO: YELLOW
GLUCOSE UR STRIP-MCNC: NEGATIVE MG/DL
HGB UR QL STRIP: ABNORMAL
KETONES UR STRIP-MCNC: NEGATIVE MG/DL
LEUKOCYTE ESTERASE UR QL STRIP: NEGATIVE
NITRATE UR QL: POSITIVE
PH UR STRIP: 7 PH (ref 5–7)
RBC #/AREA URNS AUTO: ABNORMAL /HPF
SOURCE: ABNORMAL
SP GR UR STRIP: <=1.005 (ref 1–1.03)
UROBILINOGEN UR STRIP-ACNC: 0.2 EU/DL (ref 0.2–1)
WBC #/AREA URNS AUTO: ABNORMAL /HPF

## 2019-04-17 PROCEDURE — 81001 URINALYSIS AUTO W/SCOPE: CPT | Performed by: UROLOGY

## 2019-04-17 PROCEDURE — 99213 OFFICE O/P EST LOW 20 MIN: CPT | Mod: 25 | Performed by: UROLOGY

## 2019-04-17 PROCEDURE — 51798 US URINE CAPACITY MEASURE: CPT | Performed by: UROLOGY

## 2019-04-17 NOTE — PROGRESS NOTES
F/u OAB, s/p Botox 100 units on 4/4/19    Pt states that she can go much longer between visits to the restroom, very pleased.    Drinking massive amounts of water; has large sport bottle with her today.    PVR: 180 ml    Results for orders placed or performed in visit on 04/17/19   UA reflex to Microscopic   Result Value Ref Range    Color Urine Yellow     Appearance Urine Clear     Glucose Urine Negative NEG^Negative mg/dL    Bilirubin Urine Negative NEG^Negative    Ketones Urine Negative NEG^Negative mg/dL    Specific Gravity Urine <=1.005 1.003 - 1.035    Blood Urine Trace (A) NEG^Negative    pH Urine 7.0 5.0 - 7.0 pH    Protein Albumin Urine Negative NEG^Negative mg/dL    Urobilinogen Urine 0.2 0.2 - 1.0 EU/dL    Nitrite Urine Positive (A) NEG^Negative    Leukocyte Esterase Urine Negative NEG^Negative    Source Midstream Urine    Urine Microscopic   Result Value Ref Range    WBC Urine 0 - 5 OTO5^0 - 5 /HPF    RBC Urine O - 2 OTO2^O - 2 /HPF    Bacteria Urine Many (A) NEG^Negative /HPF       IMP:  1. OAB, improved with Botox  2. Massive water, dilute urine      PLAN:  1. Discussed situation with patient in detail.  2. Consider moderation of water; pt expresses understanding  3. RTC 6 mos to reassess need for additional Botox or not  4. 15 minutes spent with patient, 100% spent in counseling and coordination of care for OAB.

## 2019-04-17 NOTE — PROGRESS NOTES
Bladder Scan performed. 180 maximum residual urine detected after 3 scans. MD shawanda Villeda RN

## 2019-09-20 ENCOUNTER — TELEPHONE (OUTPATIENT)
Dept: UROLOGY | Facility: CLINIC | Age: 79
End: 2019-09-20

## 2019-09-20 NOTE — TELEPHONE ENCOUNTER
Surgery Pre-Certification     Medical Record Number: [id number]  Paulo Giron  YOB: 1940   Phone: [phone] (home)   Primary Provider: Krisite Bowen     Diagnosis:  OAB     Surgeon: esteban  Surgical Procedure: cysto botox  BMI:  na  ICD-10 Coded: N32.81  Hospital: in clinic  In-Patient/ Out-Patient status: Outpatient  Length of surgery: 20 min  Anesthesia: local  Implanted Cardiac Device: N/A     Date of Surgery:  10/17/2019  When post-op appointment needed:  2 weeks  Special Requests/ Equipment:: botox  CPM Needed: na  Requestor:  Ameena Phillips, CMA    Form is with Urology dept. 09/20/2019    Sent form and clinicals to [facility]. 09/20/2019    Botox  has been approved for following dates 09/19/2019 to 09/19/2021 per [employer]. Approved EOC number [id number].

## 2019-10-17 ENCOUNTER — OFFICE VISIT (OUTPATIENT)
Dept: UROLOGY | Facility: CLINIC | Age: 79
End: 2019-10-17
Payer: COMMERCIAL

## 2019-10-17 VITALS — HEART RATE: 71 BPM | DIASTOLIC BLOOD PRESSURE: 96 MMHG | OXYGEN SATURATION: 95 % | SYSTOLIC BLOOD PRESSURE: 142 MMHG

## 2019-10-17 DIAGNOSIS — N32.81 OAB (OVERACTIVE BLADDER): Primary | ICD-10-CM

## 2019-10-17 LAB
ALBUMIN UR-MCNC: NEGATIVE MG/DL
APPEARANCE UR: ABNORMAL
BACTERIA #/AREA URNS HPF: ABNORMAL /HPF
BILIRUB UR QL STRIP: NEGATIVE
COLOR UR AUTO: YELLOW
GLUCOSE UR STRIP-MCNC: NEGATIVE MG/DL
HGB UR QL STRIP: ABNORMAL
KETONES UR STRIP-MCNC: NEGATIVE MG/DL
LEUKOCYTE ESTERASE UR QL STRIP: ABNORMAL
NITRATE UR QL: POSITIVE
NON-SQ EPI CELLS #/AREA URNS LPF: ABNORMAL /LPF
PH UR STRIP: 5 PH (ref 5–7)
RBC #/AREA URNS AUTO: ABNORMAL /HPF
SOURCE: ABNORMAL
SP GR UR STRIP: 1.01 (ref 1–1.03)
URNS CMNT MICRO: ABNORMAL
UROBILINOGEN UR STRIP-ACNC: 0.2 EU/DL (ref 0.2–1)
WBC #/AREA URNS AUTO: ABNORMAL /HPF

## 2019-10-17 PROCEDURE — 81001 URINALYSIS AUTO W/SCOPE: CPT | Performed by: UROLOGY

## 2019-10-17 PROCEDURE — 99213 OFFICE O/P EST LOW 20 MIN: CPT | Performed by: UROLOGY

## 2019-10-17 RX ORDER — CIPROFLOXACIN 500 MG/1
500 TABLET, FILM COATED ORAL ONCE
Status: CANCELLED | OUTPATIENT
Start: 2019-10-17 | End: 2019-10-17

## 2019-10-17 RX ORDER — NITROFURANTOIN 25; 75 MG/1; MG/1
100 CAPSULE ORAL 2 TIMES DAILY
Qty: 10 CAPSULE | Refills: 0 | Status: SHIPPED | OUTPATIENT
Start: 2019-10-17 | End: 2019-11-14

## 2019-10-17 NOTE — PROGRESS NOTES
F/u OAB, s/p Botox 100 units on 4/4/19    States that Botox effect seems to be wearing off a bit, now some more urge and occas urge incontinence.    Denies dysuria, gross hematuria, frequency; nocturia x 2.        Results for orders placed or performed in visit on 10/17/19   UA reflex to Microscopic   Result Value Ref Range    Color Urine Yellow     Appearance Urine Slightly Cloudy     Glucose Urine Negative NEG^Negative mg/dL    Bilirubin Urine Negative NEG^Negative    Ketones Urine Negative NEG^Negative mg/dL    Specific Gravity Urine 1.015 1.003 - 1.035    Blood Urine Trace (A) NEG^Negative    pH Urine 5.0 5.0 - 7.0 pH    Protein Albumin Urine Negative NEG^Negative mg/dL    Urobilinogen Urine 0.2 0.2 - 1.0 EU/dL    Nitrite Urine Positive (A) NEG^Negative    Leukocyte Esterase Urine Trace (A) NEG^Negative    Source Midstream Urine    Urine Microscopic   Result Value Ref Range    WBC Urine 0 - 5 OTO5^0 - 5 /HPF    RBC Urine O - 2 OTO2^O - 2 /HPF    Squamous Epithelial /LPF Urine Few FEW^Few /LPF    Bacteria Urine Moderate (A) NEG^Negative /HPF    Comment Urine       Unconcentrated Microscopic Exam performed due to low specimen volume <3ml       IMP:  1. OAB  2. Probable UTI      PLAN:  1. Discussed situation with patient in detail.  2. UC; emperic Macrobid x 5 d  3. RTC next wk for UA, probable Botox  4. 15 minutes spent with patient, more than 50% spent in counseling and coordination of care for OAB/UTI.

## 2019-10-31 ENCOUNTER — OFFICE VISIT (OUTPATIENT)
Dept: UROLOGY | Facility: CLINIC | Age: 79
End: 2019-10-31
Payer: COMMERCIAL

## 2019-10-31 DIAGNOSIS — N32.81 OAB (OVERACTIVE BLADDER): Primary | ICD-10-CM

## 2019-10-31 LAB
ALBUMIN UR-MCNC: NEGATIVE MG/DL
APPEARANCE UR: CLEAR
BACTERIA #/AREA URNS HPF: ABNORMAL /HPF
BILIRUB UR QL STRIP: NEGATIVE
COLOR UR AUTO: YELLOW
GLUCOSE UR STRIP-MCNC: NEGATIVE MG/DL
HGB UR QL STRIP: ABNORMAL
KETONES UR STRIP-MCNC: NEGATIVE MG/DL
LEUKOCYTE ESTERASE UR QL STRIP: ABNORMAL
NITRATE UR QL: POSITIVE
NON-SQ EPI CELLS #/AREA URNS LPF: ABNORMAL /LPF
PH UR STRIP: 5 PH (ref 5–7)
RBC #/AREA URNS AUTO: ABNORMAL /HPF
SOURCE: ABNORMAL
SP GR UR STRIP: 1.02 (ref 1–1.03)
UROBILINOGEN UR STRIP-ACNC: 0.2 EU/DL (ref 0.2–1)
WBC #/AREA URNS AUTO: ABNORMAL /HPF

## 2019-10-31 PROCEDURE — 52287 CYSTOSCOPY CHEMODENERVATION: CPT | Performed by: UROLOGY

## 2019-10-31 PROCEDURE — 81001 URINALYSIS AUTO W/SCOPE: CPT | Performed by: UROLOGY

## 2019-10-31 RX ORDER — CIPROFLOXACIN 500 MG/1
500 TABLET, FILM COATED ORAL ONCE
Status: COMPLETED | OUTPATIENT
Start: 2019-10-31 | End: 2019-10-31

## 2019-10-31 RX ADMIN — CIPROFLOXACIN 500 MG: 500 TABLET, FILM COATED ORAL at 10:47

## 2019-10-31 NOTE — PROGRESS NOTES
The following medication was given:      MEDICATION: botox  ROUTE: IM  SITE: bladder  DOSE: 100 unit(s)  LOT #: [id number]  :  allergINNOBI  EXPIRATION DATE:  10/2020  NDC#: 7235-2294-16    Prior to injection, verified patient identity using patient's name and date of birth.  Due to injection administration, patient instructed to remain in clinic for 15 minutes  afterwards, and to report any adverse reaction to me immediately.     botox     Drug Amount Wasted:  None.  Vial/Syringe: Single dose vial  Expiration Date:  11/2020  The following medication was given:     MEDICATION:  Ciprofloxin  ROUTE: PO  SITE: mouth  DOSE: 500  LOT #: [id number]  : [employer]  EXPIRATION DATE: 11/2020  NDC#: 11349-364-59   Was there drug waste?  no

## 2019-10-31 NOTE — PROGRESS NOTES
F/u OAB, s/p Botox 100 units on 4/4/19, here for next Botox      Denies dysuria, systemic toxicity. Requesting next Botox; informed consent obtained.      Current Outpatient Medications   Medication     ASPIRIN PO     botulinum toxin type A (BOTOX) 100 UNITS injection     cetirizine (ZYRTEC) 10 MG tablet     conjugated estrogens (PREMARIN) cream     LOSARTAN POTASSIUM PO     metFORMIN (GLUCOPHAGE-XR) 750 MG 24 hr tablet     nitroFURantoin macrocrystal-monohydrate (MACROBID) 100 MG capsule     SIMVASTATIN PO     triamcinolone (NASACORT) 55 MCG/ACT nasal inhaler     VITAMIN D, CHOLECALCIFEROL, PO     Current Facility-Administered Medications   Medication     botulinum toxin type A (BOTOX) 100 units injection 100 Units            Results for orders placed or performed in visit on 10/31/19   UA reflex to Microscopic   Result Value Ref Range    Color Urine Yellow     Appearance Urine Clear     Glucose Urine Negative NEG^Negative mg/dL    Bilirubin Urine Negative NEG^Negative    Ketones Urine Negative NEG^Negative mg/dL    Specific Gravity Urine 1.020 1.003 - 1.035    Blood Urine Trace (A) NEG^Negative    pH Urine 5.0 5.0 - 7.0 pH    Protein Albumin Urine Negative NEG^Negative mg/dL    Urobilinogen Urine 0.2 0.2 - 1.0 EU/dL    Nitrite Urine Positive (A) NEG^Negative    Leukocyte Esterase Urine Moderate (A) NEG^Negative    Source Midstream Urine        Urojet for 10 minutes. Then Botox A 100 units in 10 ml saline, injected into posterior wall at 3 sites; excellent blebs and hemostasis.      IMP:  1. Botox for OAB  2. Assymptomatic bactiuria      PLAN:  1. Discussed situation with patient in detail.  2. RTC 2 wks  3. Precautions  4. Cipro x 3 d (in light of assymptomatic bactiuria)

## 2019-11-14 ENCOUNTER — OFFICE VISIT (OUTPATIENT)
Dept: UROLOGY | Facility: CLINIC | Age: 79
End: 2019-11-14
Payer: COMMERCIAL

## 2019-11-14 VITALS — SYSTOLIC BLOOD PRESSURE: 170 MMHG | DIASTOLIC BLOOD PRESSURE: 85 MMHG | OXYGEN SATURATION: 94 % | HEART RATE: 67 BPM

## 2019-11-14 DIAGNOSIS — N32.81 OAB (OVERACTIVE BLADDER): Primary | ICD-10-CM

## 2019-11-14 LAB
ALBUMIN UR-MCNC: NEGATIVE MG/DL
APPEARANCE UR: CLEAR
BACTERIA #/AREA URNS HPF: ABNORMAL /HPF
BILIRUB UR QL STRIP: NEGATIVE
COLOR UR AUTO: YELLOW
GLUCOSE UR STRIP-MCNC: NEGATIVE MG/DL
HGB UR QL STRIP: NEGATIVE
KETONES UR STRIP-MCNC: NEGATIVE MG/DL
LEUKOCYTE ESTERASE UR QL STRIP: NEGATIVE
NITRATE UR QL: POSITIVE
PH UR STRIP: 7 PH (ref 5–7)
RBC #/AREA URNS AUTO: ABNORMAL /HPF
SOURCE: ABNORMAL
SP GR UR STRIP: 1.01 (ref 1–1.03)
UROBILINOGEN UR STRIP-ACNC: 1 EU/DL (ref 0.2–1)
WBC #/AREA URNS AUTO: ABNORMAL /HPF

## 2019-11-14 PROCEDURE — 81001 URINALYSIS AUTO W/SCOPE: CPT | Performed by: UROLOGY

## 2019-11-14 PROCEDURE — 99213 OFFICE O/P EST LOW 20 MIN: CPT | Mod: 25 | Performed by: UROLOGY

## 2019-11-14 PROCEDURE — 51798 US URINE CAPACITY MEASURE: CPT | Performed by: UROLOGY

## 2019-11-14 NOTE — PROGRESS NOTES
"F/u OAB, s/p Botox 100 units on 10/31/19    \"I can shop in three stores before using the restroom; it's great.\"    Nearly dry; wears medium pad for security. Rare urge.     Denies dysuria, gross hematuria, frequency; noc x 1-2.       Current Outpatient Medications   Medication     ASPIRIN PO     botulinum toxin type A (BOTOX) 100 UNITS injection     cetirizine (ZYRTEC) 10 MG tablet     conjugated estrogens (PREMARIN) cream     LOSARTAN POTASSIUM PO     metFORMIN (GLUCOPHAGE-XR) 750 MG 24 hr tablet     SIMVASTATIN PO     triamcinolone (NASACORT) 55 MCG/ACT nasal inhaler     VITAMIN D, CHOLECALCIFEROL, PO     Current Facility-Administered Medications   Medication     botulinum toxin type A (BOTOX) 100 units injection 100 Units       Results for orders placed or performed in visit on 11/14/19   UA reflex to Microscopic     Status: Abnormal   Result Value Ref Range    Color Urine Yellow     Appearance Urine Clear     Glucose Urine Negative NEG^Negative mg/dL    Bilirubin Urine Negative NEG^Negative    Ketones Urine Negative NEG^Negative mg/dL    Specific Gravity Urine 1.010 1.003 - 1.035    Blood Urine Negative NEG^Negative    pH Urine 7.0 5.0 - 7.0 pH    Protein Albumin Urine Negative NEG^Negative mg/dL    Urobilinogen Urine 1.0 0.2 - 1.0 EU/dL    Nitrite Urine Positive (A) NEG^Negative    Leukocyte Esterase Urine Negative NEG^Negative    Source Midstream Urine    Urine Microscopic     Status: Abnormal   Result Value Ref Range    WBC Urine 0 - 5 OTO5^0 - 5 /HPF    RBC Urine O - 2 OTO2^O - 2 /HPF    Bacteria Urine Many (A) NEG^Negative /HPF       IMP:  OAB, doing very well with Botox      PLAN:  1. Discussed situation with patient in detail.  2. RTC 6 mos or sooner prn  3. 15 minutes spent with patient, 100% spent in counseling and coordination of care for OAB.        "

## 2020-02-08 ENCOUNTER — HEALTH MAINTENANCE LETTER (OUTPATIENT)
Age: 80
End: 2020-02-08

## 2020-06-11 ENCOUNTER — TELEPHONE (OUTPATIENT)
Dept: UROLOGY | Facility: CLINIC | Age: 80
End: 2020-06-11

## 2020-06-15 NOTE — TELEPHONE ENCOUNTER
Patient being treated for UTI and also some pelvic issues. She will contact us to schedule a cysto/botox when she is ready.  Ameena Phillips, CMA

## 2020-07-23 ENCOUNTER — TELEPHONE (OUTPATIENT)
Dept: UROLOGY | Facility: CLINIC | Age: 80
End: 2020-07-23

## 2020-07-23 DIAGNOSIS — N32.81 OAB (OVERACTIVE BLADDER): Primary | ICD-10-CM

## 2020-07-23 NOTE — TELEPHONE ENCOUNTER
Surgery Pre-Certification    Medical Record Number: [id number]  Paulo Giron  YOB: 1940   Phone: [phone] (home)   Primary Provider: Kristie Bowen    Diagnosis:  oab    Surgeon: esteban  Surgical Procedure: cysto botox   100U   BMI:na   ICD-10 Coded: n32.81  Hospital: in clinic  In-Patient/ Out-Patient status: Outpatient  Length of surgery: 20 min  Anesthesia: local  Implanted Cardiac Device: No    Date of Surgery:  8/13/2020  When post-op appointment needed:  2 weeks  Special Requests/ Equipment:: botox  CPM Needed: na  Requestor:  Ameena Phillips CMA    Prior auth is still in place for Prior auth. Info in media tab. Approval EOC number is [id number], for dates 09/20/2019 to 09/19/2021.

## 2020-07-23 NOTE — TELEPHONE ENCOUNTER
Patient left office VM. Left message for patient to call [phone] to help schedule appointment  Ameena Phillips CMA

## 2020-08-13 ENCOUNTER — OFFICE VISIT (OUTPATIENT)
Dept: UROLOGY | Facility: CLINIC | Age: 80
End: 2020-08-13
Payer: COMMERCIAL

## 2020-08-13 DIAGNOSIS — N32.81 OAB (OVERACTIVE BLADDER): Primary | ICD-10-CM

## 2020-08-13 PROCEDURE — 52287 CYSTOSCOPY CHEMODENERVATION: CPT | Performed by: UROLOGY

## 2020-08-13 RX ORDER — CIPROFLOXACIN 500 MG/1
500 TABLET, FILM COATED ORAL ONCE
Status: COMPLETED | OUTPATIENT
Start: 2020-08-13 | End: 2020-08-13

## 2020-08-13 RX ADMIN — CIPROFLOXACIN 500 MG: 500 TABLET, FILM COATED ORAL at 10:06

## 2020-08-13 NOTE — PROGRESS NOTES
Clinic Administered Medication Documentation     Oral Medication Documentation     Patient was given Ciprofloxacin (Cipro). Prior to medication administration, verified patients identity using patient s name and date of birth. Please see MAR and medication order for additional information.      Was entire amount of medication used? Yes  Expiration Date: 06/2021     MEDICATION:  Ciprofloxacin   ROUTE: PO  SITE: mouth  DOSE: 500 mg   LOT #: [id number]  : Gurpreet  EXPIRATION DATE: 06/2021  NDC#: [id number]  Was there drug waste? No        Clinic Administered Medication Documentation        Injectable Medication Documentation     Patient was given Botox. Prior to medication administration, verified patients identity using patient s name and date of birth. Please see MAR and medication order for additional information. Patient instructed to remain in clinic for 15 minutes and report any adverse reaction to staff immediately .        Was entire vial of medication used? Yes  Vial/Syringe: Single dose vial  Expiration Date:  02/2023  Was this medication supplied by the patient? No     The following medication was given:      MEDICATION: Botox  ROUTE: IM  SITE: Bladder wall  DOSE: 100u  LOT #: [id number]  :  [employer]  EXPIRATION DATE:  02/2023  NDC#: 4771-4300-05     Nayeli Villeda RN                   F/u OAB, here for Botox 100 units    Informed consent. Botox 100 units in 6 ml saline, injected into single site in posterior wall. Excellent bleb and hemostasis.      IMP:  1. Botox for OAB      PLAN:  1. Discussed situation with patient in detail.  2. F/u 2 wks with virtual visit  3. Cipro x 1  4. Precautions

## 2020-08-27 ENCOUNTER — VIRTUAL VISIT (OUTPATIENT)
Dept: UROLOGY | Facility: CLINIC | Age: 80
End: 2020-08-27
Payer: COMMERCIAL

## 2020-08-27 DIAGNOSIS — N32.81 OAB (OVERACTIVE BLADDER): Primary | ICD-10-CM

## 2020-08-27 PROCEDURE — 99213 OFFICE O/P EST LOW 20 MIN: CPT | Mod: 95 | Performed by: UROLOGY

## 2020-08-27 NOTE — PROGRESS NOTES
"Paulo Giron is a 80 year old female who is being evaluated via a billable video visit.      The patient has been notified of following:     \"This video visit will be conducted via a call between you and your physician/provider. We have found that certain health care needs can be provided without the need for an in-person physical exam.  This service lets us provide the care you need with a video conversation.  If a prescription is necessary we can send it directly to your pharmacy.  If lab work is needed we can place an order for that and you can then stop by our lab to have the test done at a later time.    Video visits are billed at different rates depending on your insurance coverage.  Please reach out to your insurance provider with any questions.    If during the course of the call the physician/provider feels a video visit is not appropriate, you will not be charged for this service.\"    Patient has given verbal consent for Video visit? Yes  How would you like to obtain your AVS? MyChart  If you are dropped from the video visit, the video invite should be resent to: Text to cell phone: [phone]  Will anyone else be joining your video visit? No        Video-Visit Details    Type of service:  Video Visit    Video Start Time: 8:46 AM  Video End Time: 9:03 AM    Originating Location (pt. Location): Home    Distant Location (provider location):  [facility]     Platform used for Video Visit: [facility]    Osmin Guzman MD        F/u OAB, s/p Botox A 100 units in single site injection 8/13/20    Pt is doing well, wearing fewer pads and they are drier. No leak en route to the restroom this morning.    Denies dysuria, gross hematuria, frequency; nocturia x 2. Pt is very pleased.      IMP:  1. OAB, doing very well with Botox      PLAN:  1. Discussed situation with patient in detail.  2. RTC 6 mos for next Botox or sooner prn  3. 15 minutes spent with patient, more than 50% spent in counseling " and coordination of care for OAB.

## 2020-11-08 ENCOUNTER — HEALTH MAINTENANCE LETTER (OUTPATIENT)
Age: 80
End: 2020-11-08

## 2021-03-27 ENCOUNTER — HEALTH MAINTENANCE LETTER (OUTPATIENT)
Age: 81
End: 2021-03-27

## 2021-09-11 ENCOUNTER — HEALTH MAINTENANCE LETTER (OUTPATIENT)
Age: 81
End: 2021-09-11

## 2022-04-23 ENCOUNTER — HEALTH MAINTENANCE LETTER (OUTPATIENT)
Age: 82
End: 2022-04-23

## 2022-05-24 ENCOUNTER — TRANSFERRED RECORDS (OUTPATIENT)
Dept: FAMILY MEDICINE | Facility: CLINIC | Age: 82
End: 2022-05-24

## 2022-10-29 ENCOUNTER — HEALTH MAINTENANCE LETTER (OUTPATIENT)
Age: 82
End: 2022-10-29

## 2023-06-01 ENCOUNTER — HEALTH MAINTENANCE LETTER (OUTPATIENT)
Age: 83
End: 2023-06-01

## 2024-02-07 ENCOUNTER — TRANSFERRED RECORDS (OUTPATIENT)
Dept: FAMILY MEDICINE | Facility: CLINIC | Age: 84
End: 2024-02-07

## 2024-03-01 NOTE — PROGRESS NOTES
"Assessment & Plan     1. Essential hypertension  - Slightly above goal in clinic today. Discussed with patient to continue check her BP daily for the next month and keep a log of values and bring this in for her one month follow up to review. Discussed goal BP is <140/90. Encouraged her to continue to follow a low salt diet and to increase her activity levels. Return to clinic and ER precautions discussed.     2. Hyperlipidemia LDL goal <70  - Reviewed cholesterol results from [facility] on 01/08/24. Discussed with Paulo her cholesterol levels are not significantly above the normal range however given she also has diabetes, explained that her \"goal\" cholesterol results are even lower than the normal range, she demonstrated understanding. Plan will be to recheck these levels in one month (as this will be the 3 month tammi) and if still elevated will try her on a trial of Rosuvastatin 5 mg every evening and see if she can tolerate this. Another option I discussed would be to see cardiology for their recommendations.     3. Type 2 diabetes mellitus without complication, without long-term current use of insulin (H)  - Stable, last A1C 6.9 on 01/08/24. Discussed typically would initiate treatment for diabetes at an A1C of 7 or above however patient prefers to not be on any medications for this. Will continue to monitor A1C for now. I did recommend she start a Aspirin 81 mg tablet daily, she will plan to start this. I also encouraged her to check her blood sugars at least twice a day.     Follow up in one month fasting to recheck cholesterol levels and recheck blood pressure. Return to clinic and ER precautions discussed.     Selma Rosenberg PA-C  [facility]       Subjective     Paulo Gideon Giron is a 83 year old female who presents to clinic today for the following health issues:    HPI   Chief Complaint   Patient presents with     New Patient     Fasting new patient     Consult     Discuss cholesterol, had " lipids done 1/8/24 at [facility]  Was on a statin in 0974-1964 but had vertigo and was taken off  Would prefer to manage cholesterol without medication or a very low dose      Paulo is a new patient to the clinic who presents to establish care and to discuss her cholesterol levels. Patient was previously being seen at [facility] in [place].     Paulo has a history of hyperlipidemia and was on Atorvastatin back in [id number] however this made her dizzy. She does note however at the time she was also on diabetic medications and other medications as well and was taken off of everything as it was believed she was dizzy from being on so many medications. Paulo also notes she tries to be holistic in terms of her health and does not like taking medications unless she has to. She last had her cholesterol levels checked on 01/08/24 and was told they are elevated and that it is recommended she re-try taking a statin given her history of diabetes as well. Paulo presents today to see if this is something I would recommend. She notes since that visit she has initiated a low salt diet, no caffeine, and has tried eating more fish and chicken for protein sources (has significantly limited red meat from her diet). She also has been eating more vegetables. She does note she does have a history of acid reflux so has to try to watch what she eats in general. Paulo also notes she has tried to increase her activity levels although notes she could work on this (she does have a treadmill in her basement).     Paulo also has a history of type 2 diabetes. She states she was on Metformin and another medication from 8903-1821 but went off of them due to them contributing to her dizziness. Her last A1C was 6.9 on 01/08/24. She does not check her blood sugars at home.     Past medical history and daily medications reviewed. Of note, Paulo takes Losartan 100 mg daily for hypertension. She states she has recently purchased a new blood pressure cuff and has  "been averaging in the high 130's / 70's-80's. She notes it is typically the top number that is higher than it should be. She denies any symptoms of dizziness, lightheadedness, blurry vision, chest pain, shortness of breath, palpitations, or swelling in her legs. She does not have a cardiac history other than hypertension, has never seen a cardiologist in the past.         Objective    BP (!) 144/84 (BP Location: Right arm, Patient Position: Sitting, Cuff Size: Adult Large)   Pulse 71   Temp 97.4  F (36.3  C) (Temporal)   Ht 1.575 m (5' 2\")   Wt 73 kg (161 lb)   SpO2 98%   BMI 29.45 kg/m    Body mass index is 29.45 kg/m .    Physical Examination:  GENERAL: alert and no distress  EYES: Eyes grossly normal to inspection, PERRL and conjunctivae and sclerae normal  HENT: mouth without ulcers or lesions  NECK: no adenopathy, no asymmetry, masses  RESP: lungs clear to auscultation - no rales, rhonchi or wheezes  CV: regular rate and rhythm, normal S1 S2, no S3 or S4, no murmur, click or rub, no peripheral edema   ABDOMEN: soft and non-tender  MS: kyphosis of thoracic spine noted, otherwise no gross musculoskeletal defects noted, no edema  SKIN: no suspicious lesions or rashes  NEURO: Normal strength and tone, mentation intact and speech normal  PSYCH: mentation appears normal, affect normal/bright    "

## 2024-03-04 ENCOUNTER — OFFICE VISIT (OUTPATIENT)
Dept: FAMILY MEDICINE | Facility: CLINIC | Age: 84
End: 2024-03-04

## 2024-03-04 VITALS
HEIGHT: 62 IN | TEMPERATURE: 97.4 F | WEIGHT: 161 LBS | HEART RATE: 71 BPM | OXYGEN SATURATION: 98 % | SYSTOLIC BLOOD PRESSURE: 144 MMHG | BODY MASS INDEX: 29.63 KG/M2 | DIASTOLIC BLOOD PRESSURE: 84 MMHG

## 2024-03-04 DIAGNOSIS — E11.9 TYPE 2 DIABETES MELLITUS WITHOUT COMPLICATION, WITHOUT LONG-TERM CURRENT USE OF INSULIN (H): ICD-10-CM

## 2024-03-04 DIAGNOSIS — I10 ESSENTIAL HYPERTENSION: Primary | ICD-10-CM

## 2024-03-04 DIAGNOSIS — E78.5 HYPERLIPIDEMIA LDL GOAL <70: ICD-10-CM

## 2024-03-04 PROBLEM — K21.9 GASTROESOPHAGEAL REFLUX DISEASE WITHOUT ESOPHAGITIS: Status: ACTIVE | Noted: 2024-03-04

## 2024-03-04 PROBLEM — Z71.89 ACP (ADVANCE CARE PLANNING): Status: ACTIVE | Noted: 2024-03-04

## 2024-03-04 PROCEDURE — 99204 OFFICE O/P NEW MOD 45 MIN: CPT

## 2024-03-04 RX ORDER — MULTIVITAMIN
1 TABLET ORAL DAILY
COMMUNITY

## 2024-03-04 RX ORDER — ASCORBIC ACID 500 MG/5ML
500 SYRUP ORAL DAILY
COMMUNITY

## 2024-03-04 RX ORDER — LATANOPROST 50 UG/ML
1 SOLUTION/ DROPS OPHTHALMIC DAILY
COMMUNITY
Start: 2024-02-12

## 2024-03-04 RX ORDER — LOSARTAN POTASSIUM 100 MG/1
1 TABLET ORAL
COMMUNITY
Start: 2024-01-09 | End: 2024-04-08

## 2024-03-04 RX ORDER — ERGOCALCIFEROL (VITAMIN D2) 10 MCG
TABLET ORAL EVERY OTHER DAY
COMMUNITY

## 2024-03-04 RX ORDER — MULTIVITAMIN WITH IRON
1 TABLET ORAL DAILY
COMMUNITY

## 2024-03-04 NOTE — NURSING NOTE
Chief Complaint   Patient presents with    New Patient     Fasting new patient    Consult     Discuss cholesterol, had lipids done 1/8/24 at [facility]  Was on a statin in 3945-3233 but had vertigo and was taken off  Would prefer to manage cholesterol without medication or a very low dose         Pre-visit Screening:  Immunizations:  up to date  Colonoscopy:  is up to date  Mammogram: is up to date  Asthma Action Test/Plan:  NA  PHQ9:  NA phq2 done today  GAD7:  NA  Questioned patient about current smoking habits Pt. has never smoked.  Ok to leave detailed message on voice mail for today's visit only Yes, phone # [phone]

## 2024-03-04 NOTE — PATIENT INSTRUCTIONS
Thanks for coming in today Paulo.     Let's have you continue focusing on healthy eating and daily exercise and follow up in one month for an office visit.     We will also recheck your blood pressure at the time and please keep a log of your values.     Please start taking a daily Aspirin 81 mg daily.     Let me know if you have any questions or concerns.

## 2024-03-05 PROBLEM — M85.851 OSTEOPENIA OF RIGHT HIP: Status: ACTIVE | Noted: 2024-03-05

## 2024-03-05 PROBLEM — M40.209 KYPHOSIS DEFORMITY OF SPINE: Status: ACTIVE | Noted: 2024-03-05

## 2024-03-05 PROBLEM — N39.41 URGE INCONTINENCE OF URINE: Status: ACTIVE | Noted: 2024-03-05

## 2024-03-31 ENCOUNTER — HEALTH MAINTENANCE LETTER (OUTPATIENT)
Age: 84
End: 2024-03-31

## 2024-04-08 ENCOUNTER — OFFICE VISIT (OUTPATIENT)
Dept: FAMILY MEDICINE | Facility: CLINIC | Age: 84
End: 2024-04-08

## 2024-04-08 VITALS
TEMPERATURE: 97.8 F | DIASTOLIC BLOOD PRESSURE: 82 MMHG | BODY MASS INDEX: 29.63 KG/M2 | SYSTOLIC BLOOD PRESSURE: 142 MMHG | WEIGHT: 162 LBS | OXYGEN SATURATION: 97 % | HEART RATE: 75 BPM

## 2024-04-08 DIAGNOSIS — I10 ESSENTIAL HYPERTENSION: ICD-10-CM

## 2024-04-08 DIAGNOSIS — Z00.00 ENCOUNTER FOR MEDICARE ANNUAL WELLNESS EXAM: ICD-10-CM

## 2024-04-08 DIAGNOSIS — E11.9 TYPE 2 DIABETES MELLITUS WITHOUT COMPLICATION, WITHOUT LONG-TERM CURRENT USE OF INSULIN (H): ICD-10-CM

## 2024-04-08 DIAGNOSIS — M85.851 OSTEOPENIA OF RIGHT HIP: ICD-10-CM

## 2024-04-08 DIAGNOSIS — E78.5 HYPERLIPIDEMIA LDL GOAL <70: ICD-10-CM

## 2024-04-08 LAB
ALBUMIN SERPL-MCNC: 3.4 G/DL (ref 3.6–5.1)
ALBUMIN/GLOB SERPL: 1.1 {RATIO} (ref 1–2.5)
ALP SERPL-CCNC: 64 U/L (ref 33–130)
ALT 1742-6: 13 U/L (ref 0–32)
AST 1920-8: 14 U/L (ref 0–35)
BILIRUB SERPL-MCNC: 0.8 MG/DL (ref 0.2–1.2)
BUN SERPL-MCNC: 18 MG/DL (ref 7–25)
BUN/CREATININE RATIO: 19.6 (ref 6–32)
CALCIUM SERPL-MCNC: 8.6 MG/DL (ref 8.6–10.3)
CHLORIDE SERPLBLD-SCNC: 104.7 MMOL/L (ref 98–110)
CHOLEST SERPL-MCNC: 225 MG/DL (ref 0–199)
CHOLEST/HDLC SERPL: 4 {RATIO} (ref 0–5)
CO2 SERPL-SCNC: 25.1 MMOL/L (ref 20–32)
CREAT SERPL-MCNC: 0.92 MG/DL (ref 0.6–1.3)
GLOBULIN, CALCULATED - QUEST: 3.1 (ref 1.9–3.7)
GLUCOSE SERPL-MCNC: 131 MG/DL (ref 60–99)
HDLC SERPL-MCNC: 64 MG/DL (ref 40–150)
HEMOGLOBIN A1C: 6.8 % (ref 4–5.6)
LDLC SERPL CALC-MCNC: 140 MG/DL (ref 0–130)
POTASSIUM SERPL-SCNC: 4.34 MMOL/L (ref 3.5–5.3)
PROT SERPL-MCNC: 6.5 G/DL (ref 6.1–8.1)
SODIUM SERPL-SCNC: 139.1 MMOL/L (ref 135–146)
TRIGL SERPL-MCNC: 104 MG/DL (ref 0–149)

## 2024-04-08 PROCEDURE — 80053 COMPREHEN METABOLIC PANEL: CPT

## 2024-04-08 PROCEDURE — G0439 PPPS, SUBSEQ VISIT: HCPCS

## 2024-04-08 PROCEDURE — 80061 LIPID PANEL: CPT

## 2024-04-08 PROCEDURE — 83036 HEMOGLOBIN GLYCOSYLATED A1C: CPT

## 2024-04-08 PROCEDURE — 99214 OFFICE O/P EST MOD 30 MIN: CPT | Mod: 25

## 2024-04-08 PROCEDURE — 36415 COLL VENOUS BLD VENIPUNCTURE: CPT

## 2024-04-08 RX ORDER — LOSARTAN POTASSIUM 100 MG/1
100 TABLET ORAL
Qty: 90 TABLET | Refills: 1 | Status: SHIPPED | OUTPATIENT
Start: 2024-04-08 | End: 2024-07-08

## 2024-04-08 RX ORDER — ROSUVASTATIN CALCIUM 5 MG/1
5 TABLET, COATED ORAL DAILY
Qty: 90 TABLET | Refills: 0 | Status: SHIPPED | OUTPATIENT
Start: 2024-04-08 | End: 2024-07-08

## 2024-04-08 RX ORDER — ASPIRIN 81 MG/1
81 TABLET ORAL DAILY
COMMUNITY

## 2024-04-08 NOTE — PROGRESS NOTES
Paulo Giron is a 83 year old female who presents for Medicare Annual Wellness Visit.    Current providers caring for this patient include:  Patient Care Team:  Selma Rosenberg PA-C as PCP - General (Family Medicine)  [facility] as Assigned PCP    Complete Medical and Social history reviewed with patient, outlined below.    Patient Active Problem List   Diagnosis     Health Care Home     Mixed incontinence     ACP (advance care planning)     Hyperlipidemia LDL goal <70     Essential hypertension     Type 2 diabetes mellitus without complication, without long-term current use of insulin (H)     Gastroesophageal reflux disease without esophagitis     Urge incontinence of urine     Osteopenia of right hip     Kyphosis deformity of spine     No past medical history on file.    No past surgical history on file.    Family History   Problem Relation Age of Onset     Hypotension Mother         lived into  her 90's     Myocardial Infarction Father 65        lived into his 80's     Aneurysm Father      Hypertension Father      Pneumonia Sister         pseduo-pneumonia     Pneumonia Sister         psuedo-pneumonia     No Known Problems Brother         in his 90's and in good health     Breast Cancer No family hx of      Social History     Tobacco Use     Smoking status: Never     Passive exposure: Never     Smokeless tobacco: Never   Substance Use Topics     Alcohol use: Not Currently     Diet: regular, low salt/low fat  Physical Activity: active without specific exercise program, sciatica pain is holding her back from exercising  Depression Screen:    Over the past 2 weeks, patient has felt down, depressed, or hopeless:  No    Over the past 2 weeks, patient has felt little interest or pleasure in doing things: No    Functional ability/Safety screen:  Up and go test (able to get up and walk longer than 30 seconds): Passed  Patient needs assistance with: nothing  Patient's home has the following  possible safety concerns: none identified  Patient has concerns about her hearing:  Yes, she wears hearing aids  Cognitive Screen  Patient repeats three objects (ball, flag, tree)      Clock drawing test:  ABNORMAL  Recalls three objects after 3 minutes (ball,flag,tree):      recalls 3 objects (3 points)    Physical Exam:  BP (!) 142/82 (BP Location: Right arm, Patient Position: Sitting, Cuff Size: Adult Large)   Pulse 75   Temp 97.8  F (36.6  C) (Temporal)   Wt 73.5 kg (162 lb)   SpO2 97%   BMI 29.63 kg/m     Body mass index is 29.63 kg/m .     See physical exam in provider note below.      Health Maintenance   Topic Date Due     DEXA  Never done     EYE EXAM  Never done     INFLUENZA VACCINE (1) 06/30/2024 (Originally 9/1/2023)     MICROALBUMIN  01/08/2025 (Originally 1940)     RSV VACCINE (Pregnancy & 60+) (1 - 1-dose 60+ series) 03/04/2025 (Originally 5/4/2002)     ZOSTER IMMUNIZATION (2 of 3) 03/04/2029 (Originally 3/11/2008)     COVID-19 Vaccine (4 - 2023-24 season) 03/04/2029 (Originally 9/1/2023)     A1C  10/08/2024     DIABETIC FOOT EXAM  01/08/2025     FALL RISK ASSESSMENT  03/04/2025     MEDICARE ANNUAL WELLNESS VISIT  04/08/2025     BMP  04/08/2025     LIPID  04/08/2025     ADVANCE CARE PLANNING  03/04/2029     DTAP/TDAP/TD IMMUNIZATION (3 - Td or Tdap) 03/06/2034     PHQ-2 (once per calendar year)  Completed     Pneumococcal Vaccine: 65+ Years  Completed     IPV IMMUNIZATION  Aged Out     HPV IMMUNIZATION  Aged Out     MENINGITIS IMMUNIZATION  Aged Out     RSV MONOCLONAL ANTIBODY  Aged Out     End of Life Planning:   Patient currently has an advanced directive: No.  I have verified the patient's ablity to prepare an advanced directive/make health care decisions.  Literature was provided to assist patient in preparing an advanced directive.    Education/Counseling:   Based on review of the above information, the following items were addressed:      Obesity - appropriate counseling on diet,  exercise, etc.      Elevated blood pressure - follow-up plans made      Diabetes -  access to diabetes self-management training, medical nutrition therapy, and treatment    Appropriate preventive services were discussed with this patient, including applicable screening as appropriate for cardiovascular disease, diabetes, osteopenia/osteoporosis, and glaucoma.  As appropriate for age/gender, discussed screening for colorectal cancer, prostate cancer, breast cancer, and cervical cancer.   Checklist reviewing preventive services available has been given to the patient.

## 2024-04-08 NOTE — PROGRESS NOTES
Assessment & Plan     1. Encounter for medicare annual wellness exam   - Completed, see note above.     2. Hyperlipidemia LDL goal <70  - Discussed with Paulo cholesterol levels are still not at goal, will have Paulo try Rosuvastatin 5 mg every evening for the next 3 months which she was agreeable to, RX sent to pharmacy on file. Will plan to have patient stop by for a lab only visit in 3 months to recheck cholesterol levels. She will keep me updated if she experiences and dizziness or any other side effects. Return to clinic and ER precautions discussed.   - VENOUS COLLECTION  - Lipid Panel (BFP)  - Comprehensive Metobolic Panel (BFP)  - rosuvastatin (CRESTOR) 5 MG tablet; Take 1 tablet (5 mg) by mouth daily  Dispense: 90 tablet; Refill: 0  - VENOUS COLLECTION; Future  - Comprehensive Metobolic Panel (BFP); Future  - Lipid Panel (BFP); Future    3. Essential hypertension  - Well controlled based on patient's at home readings which I reviewed today in clinic. Refills for Losartan 100 mg daily sent for one year. CMP is within normal limits. Encouraged patient to check her BP twice a week and ensure it is <140/90. Return to clinic and ER precautions discussed.   - losartan (COZAAR) 100 MG tablet; Take 1 tablet (100 mg) by mouth daily at 2 pm  Dispense: 90 tablet; Refill: 1    4. Type 2 diabetes mellitus without complication, without long-term current use of insulin (H)  - Well controlled with A1C at 6.8. Encouraged patient to follow a diabetic friendly diet. She will return in 6 months for a recheck. Also encouraged her to obtain records from her last eye exam.   - VENOUS COLLECTION  - Comprehensive Metobolic Panel (BFP)  - HEMOGLOBIN A1C (BFP)    5. Osteopenia of right hip  - History of osteopenia, due for repeat DEXA in 05/2024, order placed. Encouraged patient to take vitamin D3 1000 international unit(s), calcium 600-1200 mg from diet/supplements together, and daily weight bearing exercises (e.g., walking).   -  Radiology Referral  - DX Bone Density    Follow up in 3 months for a lab only visit and in 6 months for a medication recheck. Reasons to follow-up sooner or seek emergent care reviewed.     Selma Rosenberg PA-C  [facility]       Subjective     Paulo CARRILLO Gideon Giron is a 83 year old female who presents to clinic today for the following health issues:    HPI   Chief Complaint   Patient presents with     RECHECK     Recheck cholesterol and A1c, fasting today     Recheck Medication     Refill losartan      Paulo presents for a follow up to recheck her blood pressure, cholesterol levels, and A1C.     Hypertension Follow-up    Paulo has a history of hypertension and takes Losartan 100 mg daily. She notes she has been checking her blood pressure a couple of times a week and notes it is generally around the 130/70 range. She denies any side effects of the medication; no dizziness or lightheadedness. She also denies any symptoms of chest pain, SOB, palpitations, or swelling in her legs. She does follow a low salt diet and did switch to decaffeinated coffee after her last visit with me on 03/04/24. She does states she has not been super active recently as she has been visiting her sister in law who is in hospice. Other than hypertension she does not have any kind of cardiac history.       Do you check your blood pressure regularly outside of the clinic? Yes     Are you following a low salt diet? Yes    Are your blood pressures ever more than 140 on the top number (systolic) OR more   than 90 on the bottom number (diastolic), for example 140/90? No    How many servings of fruits and vegetables do you eat daily?  2-3    On average, how many sweetened beverages do you drink each day (Examples: soda, juice, sweet tea, etc.  Do NOT count diet or artificially sweetened beverages)? 0    How many days per week do you exercise enough to make your heart beat faster? 3 or less    How many minutes a day do you exercise  enough to make your heart beat faster? 9 or less    How many days per week do you miss taking your medication? 0    Paulo also is here today fasting to recheck her cholesterol levels. She was seen on 03/04/24 where she was previously seen at [facility] and had elevated cholesterol results in 01/2024. She notes since that visit she has initiated a low salt diet, no caffeine, and has tried eating more fish and chicken for protein sources (has significantly limited red meat from her diet). She also has been eating more vegetables. She does note she does have a history of acid reflux so has to try to watch what she eats in general. She has not been very active recently as she has been visiting her sister in law in hospice and she has had some sciatica recently that has been flaring on her left side, she is scheduled to see her chiropractor tomorrow. We did discuss at the last visit that I would recommend starting a low dose statin given her history of diabetes. She was hesitant as she was on Atorvastatin in the past however this made her dizzy although she was on many other medications in the past as well. Paulo notes she has not been checking her blood sugars recently. She did add in a baby Aspirin 81 mg daily. Her last eye exam was in 01/2024, patient will request records from her eye clinic to send them over to us.       Objective    BP (!) 142/82 (BP Location: Right arm, Patient Position: Sitting, Cuff Size: Adult Large)   Pulse 75   Temp 97.8  F (36.6  C) (Temporal)   Wt 73.5 kg (162 lb)   SpO2 97%   BMI 29.63 kg/m    Body mass index is 29.63 kg/m .    Physical Examination:  GENERAL: healthy, alert and no distress  EYES: Eyes grossly normal to inspection, PERRL and conjunctivae and sclerae normal  HENT: mouth without ulcers or lesions  NECK: no adenopathy, no asymmetry, masses, or scars and thyroid normal to palpation  RESP: lungs clear to auscultation - no rales, rhonchi or wheezes  CV: regular rate and rhythm,  normal S1 S2, no S3 or S4, no murmur, click or rub, no peripheral edema   ABDOMEN: soft and non-tender  MS: no gross musculoskeletal defects noted, no edema  SKIN: no suspicious lesions or rashes  PSYCH: mentation appears normal, affect normal/bright    Labs reviewed.  Results for orders placed or performed in visit on 04/08/24 (from the past 24 hour(s))   Lipid Panel (BFP)   Result Value Ref Range    Cholesterol 225 (A) 0 - 199 mg/dL    Triglycerides 104 0 - 149 mg/dL    HDL Cholesterol 64 40 - 150 mg/dL    LDL Cholesterol Direct 140 (A) 0 - 130 mg/dL    Cholesterol/HDL Ratio 4 0 - 5   Comprehensive Metobolic Panel (BFP)   Result Value Ref Range    Carbon Dioxide 25.1 20 - 32 mmol/L    Creatinine 0.92 0.60 - 1.30 mg/dL    Glucose 131 (A) 60 - 99 mg/dL    Sodium 139.1 135 - 146 mmol/L    Potassium 4.34 3.5 - 5.3 mmol/L    Chloride 104.7 98 - 110 mmol/L    Protein Total 6.5 6.1 - 8.1 g/dL    Albumin 3.4 (A) 3.6 - 5.1 g/dL    Alkaline Phosphatase 64 33 - 130 U/L    ALT 13 0 - 32 U/L    AST 14 0 - 35 U/L    Bilirubin Total 0.8 0.2 - 1.2 mg/dL    Urea Nitrogen 18 7 - 25 mg/dL    Calcium 8.6 8.6 - 10.3 mg/dL    BUN/Creatinine Ratio 19.6 6 - 32    Globulin Calculated 3.1 1.9 - 3.7    A/G Ratio 1.1 1 - 2.5   HEMOGLOBIN A1C (BFP)   Result Value Ref Range    Hemoglobin A1C 6.8 (A) 4 - 5.6 %

## 2024-04-08 NOTE — NURSING NOTE
Chief Complaint   Patient presents with    RECHECK     Recheck cholesterol and A1c, fasting today    Recheck Medication     Refill losartan     Pre-visit Screening:  Immunizations:  not up to date - shingrix at pharmacy  Colonoscopy:  is up to date  Mammogram: is up to date  Asthma Action Test/Plan:  BÁRBARA  PHQ9:  BÁRBARA  GAD7:  BÁRBARA  Questioned patient about current smoking habits Pt. has never smoked.  Ok to leave detailed message on voice mail for today's visit only Yes, phone # [phone]

## 2024-05-31 PROBLEM — M85.89 OSTEOPENIA OF MULTIPLE SITES: Status: ACTIVE | Noted: 2024-03-05

## 2024-07-08 ENCOUNTER — OFFICE VISIT (OUTPATIENT)
Dept: FAMILY MEDICINE | Facility: CLINIC | Age: 84
End: 2024-07-08

## 2024-07-08 VITALS
HEIGHT: 62 IN | RESPIRATION RATE: 20 BRPM | HEART RATE: 60 BPM | BODY MASS INDEX: 30.62 KG/M2 | DIASTOLIC BLOOD PRESSURE: 84 MMHG | WEIGHT: 166.4 LBS | TEMPERATURE: 98 F | SYSTOLIC BLOOD PRESSURE: 144 MMHG

## 2024-07-08 DIAGNOSIS — M85.89 OSTEOPENIA OF MULTIPLE SITES: ICD-10-CM

## 2024-07-08 DIAGNOSIS — N39.46 MIXED INCONTINENCE: ICD-10-CM

## 2024-07-08 DIAGNOSIS — E78.5 HYPERLIPIDEMIA LDL GOAL <70: ICD-10-CM

## 2024-07-08 DIAGNOSIS — I10 ESSENTIAL HYPERTENSION: ICD-10-CM

## 2024-07-08 DIAGNOSIS — E11.9 TYPE 2 DIABETES MELLITUS WITHOUT COMPLICATION, WITHOUT LONG-TERM CURRENT USE OF INSULIN (H): Primary | ICD-10-CM

## 2024-07-08 DIAGNOSIS — I65.23 BILATERAL CAROTID ARTERY STENOSIS: ICD-10-CM

## 2024-07-08 LAB — HEMOGLOBIN A1C: 7 % (ref 4–5.6)

## 2024-07-08 PROCEDURE — 99214 OFFICE O/P EST MOD 30 MIN: CPT

## 2024-07-08 PROCEDURE — 80061 LIPID PANEL: CPT

## 2024-07-08 PROCEDURE — 83036 HEMOGLOBIN GLYCOSYLATED A1C: CPT

## 2024-07-08 PROCEDURE — 36415 COLL VENOUS BLD VENIPUNCTURE: CPT

## 2024-07-08 PROCEDURE — 80053 COMPREHEN METABOLIC PANEL: CPT

## 2024-07-08 RX ORDER — ROSUVASTATIN CALCIUM 5 MG/1
5 TABLET, COATED ORAL DAILY
Qty: 90 TABLET | Refills: 1 | Status: SHIPPED | OUTPATIENT
Start: 2024-07-08

## 2024-07-08 RX ORDER — LOSARTAN POTASSIUM 100 MG/1
100 TABLET ORAL
Qty: 90 TABLET | Refills: 1 | Status: SHIPPED | OUTPATIENT
Start: 2024-07-08

## 2024-07-08 NOTE — PROGRESS NOTES
Assessment & Plan     1. Type 2 diabetes mellitus without complication, without long-term current use of insulin (H)  - Discussed with Paulo her A1C continues to increase and is now at 7% and that her goal is to be <7%. Discussed this is typically the number we discuss starting medications for diabetes control however she does not want to take more medications. Recommend she see our clinic pharmacist, Munira, for diabetes education. Paulo will plan to schedule this for later this week. Plan to recheck A1C again in 3-6 months. Return to clinic and ER precautions discussed.   - HEMOGLOBIN A1C (BFP)    2. Essential hypertension  - Continues to be elevated in clinic however per patient was well controlled when she was checking it at home a few months ago (around 130/70 range). Recommend Paulo continue to check her BP at least 2-3 times a week to ensure it is <130/80. She will follow up if it is elevated. Refills for Losartan 100 mg daily sent for 6 months.   - losartan (COZAAR) 100 MG tablet; Take 1 tablet (100 mg) by mouth daily at 2 pm  Dispense: 90 tablet; Refill: 1    3. Hyperlipidemia LDL goal <70  - Labs pending, patient will be notified of results via my chart.   - Lipid Panel (BFP)  - Comprehensive Metobolic Panel (BFP)  - VENOUS COLLECTION  - rosuvastatin (CRESTOR) 5 MG tablet; Take 1 tablet (5 mg) by mouth daily  Dispense: 90 tablet; Refill: 1    4. Bilateral carotid artery stenosis   - Discussed will plan to have patient have updated carotid ultrasound every 1-2 years (next due 07/2025). She will continue daily Rosuvastatin and Aspirin.     5. Mixed incontinence  - Urology referral placed.   - Adult Urology  Referral - To [facility] Location; Future    6. Osteopenia of multiple sites   - Encouraged patient to take vitamin D3 1000 international unit(s), calcium 600-1200 mg from diet/supplements together, and daily weight bearing exercises (e.g., walking).     Follow up in 6 months for a  medication recheck. Reasons to follow-up sooner or seek emergent care reviewed.     Selma Rosenberg PA-C  [facility]       Subjective     Paulo CARRILLO Gideon Giron is a 84 year old female who presents to clinic today for the following health issues:    HPI   Chief Complaint   Patient presents with    Recheck Medication      Paulo presents for a medication recheck.     Type 2 diabetes: A1C was 6.8% at last visit on 04/08/24. Paulo notes her diet has slipped a little bit and she is trying to stay away from sugary foods/sweets but is struggling with this. She does not want to go on any medications for her diabetes. She has not done any kind of diabetes education.     Hypertension: Continues to take Losartan 100 mg daily, notes she has not been checking her blood pressure at home. Denies any side effects along with no symptoms of chest pain, SOB, palpitations, or swelling in legs.     Hyperlipidemia: She was started on Rosuvastatin 5 mg at her last visit on 04/08/24 for high cholesterol levels. She notes she has been taking the medication every evening and denies any side effects; no muscle crams or dizziness. She is fasting today and is due for a recheck on her cholesterol levels. She has recently completed lifelong screenings which showed mild stenosis in both of her carotid arteries otherwise there was no concern of PAD or an abdominal aneurysm. She continues to take an Aspirin 81 mg daily.     Incontinence: Has a history of incontinence for many years now. Notes she used to do botox injections before COVID however stopped doing them since the pandemic. Her symptoms seemed well controlled for a while however now they have returned and she can't get to the bathroom in time which is getting in the way of her doing many things. She would like a new referral to the [facility] here in [place]. She notes she has been on multiple oral medications for incontinence and none of them ever  "worked for her.     Daily medications reviewed.       Objective    BP (!) 144/84 (BP Location: Right arm, Patient Position: Chair, Cuff Size: Adult Regular)   Pulse 60   Temp 98  F (36.7  C) (Temporal)   Resp 20   Ht 1.575 m (5' 2\")   Wt 75.5 kg (166 lb 6.4 oz)   BMI 30.43 kg/m    Body mass index is 30.43 kg/m .    Physical Examination:  GENERAL: healthy, alert and no distress  EYES: Eyes grossly normal to inspection, PERRL and conjunctivae and sclerae normal  HENT: mouth without ulcers or lesions  NECK: no adenopathy, no asymmetry, masses, or scars and thyroid normal to palpation  RESP: lungs clear to auscultation - no rales, rhonchi or wheezes  CV: regular rate and rhythm, normal S1 S2, no S3 or S4, no murmur, click or rub, no peripheral edema   ABDOMEN: soft and non-tender  MS: no gross musculoskeletal defects noted, no edema  SKIN: no suspicious lesions or rashes  PSYCH: mentation appears normal, affect normal/bright    Labs reviewed.  Results for orders placed or performed in visit on 07/08/24   HEMOGLOBIN A1C (BFP)     Status: Abnormal   Result Value Ref Range    Hemoglobin A1C 7.0 (A) 4 - 5.6 %     "

## 2024-07-08 NOTE — NURSING NOTE
Paulo Giron is here for fasting blood work and medication refill.  Questioned patient about current smoking habits.  Pt. has never smoked.  Body mass index is 33.67 kg/(m^2).  PULSE regular  My Chart: active    Pre-visit planning  Immunizations - up to date  Colonoscopy -   Mammogram -   Asthma -   PHQ9  ADAMA-7    The patient has verbalized that it is ok to leave a detailed voice message on the patient's cell phone with results/recommendations from this visit.

## 2024-07-09 LAB
ALBUMIN SERPL-MCNC: 3.4 G/DL (ref 3.6–5.1)
ALP SERPL-CCNC: 57 U/L (ref 33–130)
ALT 1742-6: 17 U/L (ref 0–32)
AST 1920-8: 18 U/L (ref 0–35)
BILIRUB SERPL-MCNC: 1 MG/DL (ref 0.2–1.2)
BUN SERPL-MCNC: 20 MG/DL (ref 7–25)
BUN/CREATININE RATIO: 21 (ref 6–32)
CALCIUM SERPL-MCNC: 9 MG/DL (ref 8.6–10.3)
CHLORIDE SERPLBLD-SCNC: 107.5 MMOL/L (ref 98–110)
CHOLEST SERPL-MCNC: 163 MG/DL (ref 0–199)
CHOLEST/HDLC SERPL: 3 {RATIO} (ref 0–5)
CO2 SERPL-SCNC: 25.4 MMOL/L (ref 20–32)
CREAT SERPL-MCNC: 0.94 MG/DL (ref 0.6–1.3)
GLUCOSE SERPL-MCNC: 110 MG/DL (ref 60–99)
HDLC SERPL-MCNC: 63 MG/DL (ref 40–150)
LDLC SERPL CALC-MCNC: 80 MG/DL
POTASSIUM SERPL-SCNC: 4.27 MMOL/L (ref 3.5–5.3)
PROT SERPL-MCNC: 5.9 G/DL (ref 6.1–8.1)
SODIUM SERPL-SCNC: 139.4 MMOL/L (ref 135–146)
TRIGL SERPL-MCNC: 101 MG/DL (ref 0–149)

## 2024-07-11 ENCOUNTER — OFFICE VISIT (OUTPATIENT)
Dept: FAMILY MEDICINE | Facility: CLINIC | Age: 84
End: 2024-07-11

## 2024-07-11 DIAGNOSIS — E11.9 TYPE 2 DIABETES MELLITUS WITHOUT COMPLICATION, WITHOUT LONG-TERM CURRENT USE OF INSULIN (H): Primary | ICD-10-CM

## 2024-07-11 NOTE — PROGRESS NOTES
SUBJECTIVE / OBJECTIVE:                                                Paulo Giron is a 84 year old female seen for an initial visit for Medication Therapy Management.  She was referred to me by Gabriela Rosenberg.     REASON FOR MTM REFERRAL: Diabetes Education     PATIENT CHIEF COMPLAINT/CONCERN: Increase in A1c    PAST MEDICAL HISTORY: Reviewed in chart    MEDICAL CONDITIONS REVIEWED:    diabetes mellitus-type 2    Current Outpatient Medications   Medication Sig Dispense Refill    acetylcysteine (NAC) 500 MG CAPS capsule Take 500 mg by mouth daily      aspirin 81 MG EC tablet Take 81 mg by mouth daily      calcium-vitamin D-vitamin K (VIACTIV) 500-500-40 MG-UNT-MCG CHEW Take 1 tablet by mouth daily      latanoprost (XALATAN) 0.005 % ophthalmic solution Place 1 drop into both eyes daily      losartan (COZAAR) 100 MG tablet Take 1 tablet (100 mg) by mouth daily at 2 pm 90 tablet 1    magnesium 250 MG tablet Take 1 tablet by mouth daily      multivitamin w/minerals (MULTI-VITAMIN) tablet Take 1 tablet by mouth daily      rosuvastatin (CRESTOR) 5 MG tablet Take 1 tablet (5 mg) by mouth daily 90 tablet 1    vitamin C (ASCORBIC ACID) 500 MG/5ML liquid Take 500 mg by mouth daily      Vitamin D, Cholecalciferol, 10 MCG (400 UNIT) TABS Take by mouth every other day         Current labs include:  BP Readings from Last 3 Encounters:   07/08/24 (!) 144/84   04/08/24 (!) 142/82   03/04/24 (!) 144/84       There were no vitals taken for this visit.    Most Recent Immunizations   Administered Date(s) Administered    COVID-19 MONOVALENT 12+ (Pfizer) 11/17/2021    A2f7-65 Novel Flu 01/13/2010    Influenza (H1N1) 01/13/2010    Pneumo Conj 13-V (2010&after) 11/02/2015    Pneumococcal 23 valent 12/19/2007    TDAP (Adacel,Boostrix) 03/06/2024    Td (Adult), Adsorbed 12/14/2005    Zoster recombinant adjuvanted (SHINGRIX) 04/09/2024    Zoster vaccine, live 01/15/2008       ASSESSMENT / PLAN:                                                        Diabetes:  Assessment: Main focus today was concerning nutrition. Discussed disease state, activity, and gave resources as well.  Sent patient home with [facility] diabetes educational packet. She will follow up with any additional questions.    Patient discussed foods that she frequently eats and discussed what alternative options would be available for higher carbohydrate foods.    Patient is motivated to make dietary changes and has already started to decrease amount of sugars consumed.    Exercise is limited due to pain, however she did state that she is considering moving so more activity is available.     Patient does not care for taking medications and asked for opinion on statin. Discussed statin at this low dose is appropriate and encouraged patient to continue which she agreed to.    Patient will return to clinic in 3-6 months for follow up A1c. I would be happy to discuss medication options with patient at that time if warranted.    Status: Diabetes likely will be controlled with slight lifestyle modifications.    Drug Therapy Problems:  1) None at this time  Plan:  1) Continue with lifestyle modifications and follow up with PCP      I spent 45 minutes with this patient today.  All changes were made via collaborative practice agreement with Selma Rosenberg. A copy of the visit note was provided to the patient's primary care provider.    Munira Bennett, PharmD  Medication Therapy Management Pharmacist  [facility]  Office Phone: [phone]

## 2024-08-26 ENCOUNTER — OFFICE VISIT (OUTPATIENT)
Dept: FAMILY MEDICINE | Facility: CLINIC | Age: 84
End: 2024-08-26

## 2024-08-26 VITALS
RESPIRATION RATE: 16 BRPM | DIASTOLIC BLOOD PRESSURE: 76 MMHG | HEART RATE: 65 BPM | OXYGEN SATURATION: 97 % | SYSTOLIC BLOOD PRESSURE: 134 MMHG | BODY MASS INDEX: 29.26 KG/M2 | WEIGHT: 160 LBS

## 2024-08-26 DIAGNOSIS — M17.12 PRIMARY OSTEOARTHRITIS OF LEFT KNEE: Primary | ICD-10-CM

## 2024-08-26 DIAGNOSIS — M25.562 ACUTE PAIN OF LEFT KNEE: ICD-10-CM

## 2024-08-26 PROCEDURE — 99213 OFFICE O/P EST LOW 20 MIN: CPT

## 2024-08-26 PROCEDURE — 73562 X-RAY EXAM OF KNEE 3: CPT | Mod: LT

## 2024-08-26 NOTE — NURSING NOTE
Chief Complaint   Patient presents with    Back Pain     Was lifting heavy items and felt a pain in her back that traveled down into her leg, has been doing a lot of exercises to help her back, incident happened around 7/25/24, has still been having some pain since then      Pre-visit Screening:  Immunizations:  up to date  Colonoscopy:  is up to date  Mammogram: is up to date  Asthma Action Test/Plan:  na  PHQ9:  na  GAD7:  na  Questioned patient about current smoking habits Pt. has never smoked.  Ok to leave detailed message on voice mail for today's visit only yes, phone # [phone] (home)

## 2024-08-26 NOTE — PROGRESS NOTES
Assessment & Plan     1. Primary osteoarthritis of left knee  - Discussed with Paulo her knee symptoms and x-ray results are consistent with osteoarthritis. Recommend she continue to use ice/heat for knee, bio freeze, Tylenol for pain, and also start PT, referral placed. She will follow up with Dr. Terrazas here if her pain persists and/or worsens.   - Physical Therapy  Referral - To a Non [facility] Location (Use POS/Location)    2. Acute pain of left knee  - See above.   - XR Knee Left 3 Views  - Physical Therapy  Referral - To a Non [facility] Location (Use POS/Location)    Follow up with PT and with Dr. Terrazas if pain persists and/or worsens. Reasons to follow-up sooner or seek emergent care reviewed.     Selma Rosenberg PA-C  [facility]       Subjective     Paulo CARRILLO Gideon Giron is a 84 year old female who presents to clinic today for the following health issues:    HPI   Chief Complaint   Patient presents with    Back Pain     Was lifting heavy items and felt a pain in her back that traveled down into her leg, has been doing a lot of exercises to help her back, incident happened around 7/25/24, has still been having some pain since then       Paulo presents with concerns of left knee pain. She notes in the end of July she got out of her step son's truck and felt a pain in her left leg and knee. She denies any falls or injury. She notes she felt as though her knee would buckle and almost give out on her as she would walk. She does note she has a history of chronic pain in both knees, left worse than right, although notes it has been a while since she's had problems with this knee. She notes her pain is worse on the medial side of her knee cap and behind her left knee. She has tried using bio freeze, heat, and ice for her knee which notes has been helping especially the past 1-2 weeks, feels she can walk better but is still cautious when walking on this knee. She  denies any pain in her back, hip, or ankle/foot. She has never had her knees evaluated before, denies any surgeries.     Daily medications reviewed.       Objective    /76 (BP Location: Left arm, Patient Position: Sitting, Cuff Size: Adult Large)   Pulse 65   Resp 16   Wt 72.6 kg (160 lb)   SpO2 97%   BMI 29.26 kg/m    Body mass index is 29.26 kg/m .    Physical Examination:  GENERAL: healthy, alert and no distress  EYES: Eyes grossly normal to inspection  RESP: no audible wheezing  KNEE:       -Left knee: inspection reveals no signs of erythema or bursitis, moderate tenderness noted along medial joint line and behind patella, possible baker's cyst, normal knee flexion/extension, 5/5 strength testing, negative anterior and posterior drawers, sensation intact       -Right knee: inspection reveals no signs of erythema or bursitis, mild tenderness along medial joint line, no pain behind patella, normal knee flexion/extension, 5/5 strength testing, negative anterior and posterior drawers, sensation intact   SKIN: no suspicious lesions or rashes  PSYCH: mentation appears normal, affect normal/bright

## 2025-01-21 DIAGNOSIS — E78.5 HYPERLIPIDEMIA LDL GOAL <70: ICD-10-CM

## 2025-01-21 DIAGNOSIS — I10 ESSENTIAL HYPERTENSION: ICD-10-CM

## 2025-01-29 RX ORDER — LOSARTAN POTASSIUM 100 MG/1
TABLET ORAL
COMMUNITY
Start: 2025-01-29

## 2025-01-29 RX ORDER — ROSUVASTATIN CALCIUM 5 MG/1
5 TABLET, COATED ORAL DAILY
COMMUNITY
Start: 2025-01-29

## 2025-01-29 NOTE — TELEPHONE ENCOUNTER
Paulo Giron is requesting a refill of:    Refused Prescriptions:                       Disp   Refills    rosuvastatin (CRESTOR) 5 MG tablet [Pharma*                Sig: TAKE ONE TABLET BY MOUTH EVERY DAY  Refused By: CAMDEN AU  Reason for Refusal: Patient needs appointment    losartan (COZAAR) 100 MG tablet [Pharmacy *                Sig: TAKE ONE TABLET BY MOUTH EVERY DAY AT 2 P.M.  Refused By: CAMDEN AU  Reason for Refusal: Patient needs appointment    Needs fasting OV for refills

## 2025-02-01 ENCOUNTER — HEALTH MAINTENANCE LETTER (OUTPATIENT)
Age: 85
End: 2025-02-01

## 2025-02-11 ENCOUNTER — TRANSFERRED RECORDS (OUTPATIENT)
Dept: FAMILY MEDICINE | Facility: CLINIC | Age: 85
End: 2025-02-11

## 2025-02-14 PROBLEM — H40.9 GLAUCOMA OF BOTH EYES, UNSPECIFIED GLAUCOMA TYPE: Status: ACTIVE | Noted: 2025-02-14

## 2025-05-25 ENCOUNTER — HEALTH MAINTENANCE LETTER (OUTPATIENT)
Age: 85
End: 2025-05-25

## 2025-08-11 ENCOUNTER — MYC REFILL (OUTPATIENT)
Dept: FAMILY MEDICINE | Facility: CLINIC | Age: 85
End: 2025-08-11

## 2025-08-11 DIAGNOSIS — E78.5 HYPERLIPIDEMIA LDL GOAL <70: ICD-10-CM

## 2025-08-11 DIAGNOSIS — I10 ESSENTIAL HYPERTENSION: ICD-10-CM

## 2025-08-11 RX ORDER — ROSUVASTATIN CALCIUM 5 MG/1
5 TABLET, COATED ORAL DAILY
Qty: 30 TABLET | Refills: 0 | Status: SHIPPED | OUTPATIENT
Start: 2025-08-11

## 2025-08-11 RX ORDER — LOSARTAN POTASSIUM 100 MG/1
100 TABLET ORAL
Qty: 30 TABLET | Refills: 0 | Status: SHIPPED | OUTPATIENT
Start: 2025-08-11

## 2025-08-17 ENCOUNTER — HEALTH MAINTENANCE LETTER (OUTPATIENT)
Age: 85
End: 2025-08-17

## 2025-09-04 ENCOUNTER — OFFICE VISIT (OUTPATIENT)
Dept: FAMILY MEDICINE | Facility: CLINIC | Age: 85
End: 2025-09-04

## 2025-09-04 VITALS
TEMPERATURE: 97.2 F | BODY MASS INDEX: 30 KG/M2 | WEIGHT: 164 LBS | HEART RATE: 64 BPM | DIASTOLIC BLOOD PRESSURE: 68 MMHG | OXYGEN SATURATION: 97 % | SYSTOLIC BLOOD PRESSURE: 132 MMHG

## 2025-09-04 DIAGNOSIS — M85.89 OSTEOPENIA OF MULTIPLE SITES: ICD-10-CM

## 2025-09-04 DIAGNOSIS — E11.9 TYPE 2 DIABETES MELLITUS WITHOUT COMPLICATION, WITHOUT LONG-TERM CURRENT USE OF INSULIN (H): ICD-10-CM

## 2025-09-04 DIAGNOSIS — I10 ESSENTIAL HYPERTENSION: ICD-10-CM

## 2025-09-04 DIAGNOSIS — Z00.00 ENCOUNTER FOR MEDICARE ANNUAL WELLNESS EXAM: Primary | ICD-10-CM

## 2025-09-04 DIAGNOSIS — N39.46 MIXED INCONTINENCE: ICD-10-CM

## 2025-09-04 DIAGNOSIS — I65.23 BILATERAL CAROTID ARTERY STENOSIS: ICD-10-CM

## 2025-09-04 DIAGNOSIS — E78.5 HYPERLIPIDEMIA LDL GOAL <70: ICD-10-CM

## 2025-09-04 LAB
ALBUMIN SERPL-MCNC: 3.4 G/DL (ref 3.6–5.1)
ALP SERPL-CCNC: 54 U/L (ref 33–130)
ALT 1742-6: 13 U/L (ref 0–32)
AST 1920-8: 15 U/L (ref 0–35)
BILIRUB SERPL-MCNC: 1 MG/DL (ref 0.2–1.2)
BUN SERPL-MCNC: 19 MG/DL (ref 7–25)
BUN/CREATININE RATIO: 21 (ref 6–32)
CALCIUM SERPL-MCNC: 9.7 MG/DL (ref 8.6–10.3)
CHLORIDE SERPLBLD-SCNC: 104.8 MMOL/L (ref 98–110)
CHOLEST SERPL-MCNC: 165 MG/DL (ref 0–199)
CHOLEST/HDLC SERPL: 3 {RATIO} (ref 0–5)
CO2 SERPL-SCNC: 25.9 MMOL/L (ref 20–32)
CREAT SERPL-MCNC: 0.91 MG/DL (ref 0.6–1.3)
GLUCOSE SERPL-MCNC: 139 MG/DL (ref 60–99)
HDLC SERPL-MCNC: 56 MG/DL (ref 40–150)
HEMOGLOBIN A1C: 7.5 % (ref 4–5.6)
LDLC SERPL CALC-MCNC: 85 MG/DL (ref 0–129)
POTASSIUM SERPL-SCNC: 4.26 MMOL/L (ref 3.5–5.3)
PROT SERPL-MCNC: 6.3 G/DL (ref 6.1–8.1)
SODIUM SERPL-SCNC: 136.7 MMOL/L (ref 135–146)
TRIGL SERPL-MCNC: 118 MG/DL (ref 0–149)

## 2025-09-04 RX ORDER — ROSUVASTATIN CALCIUM 5 MG/1
5 TABLET, COATED ORAL DAILY
Qty: 90 TABLET | Refills: 1 | Status: SHIPPED | OUTPATIENT
Start: 2025-09-04

## 2025-09-04 RX ORDER — LOSARTAN POTASSIUM 100 MG/1
100 TABLET ORAL
Qty: 90 TABLET | Refills: 1 | Status: SHIPPED | OUTPATIENT
Start: 2025-09-04

## 2025-09-04 RX ORDER — ONABOTULINUMTOXINA 100 [USP'U]/1
INJECTION, POWDER, LYOPHILIZED, FOR SOLUTION INTRADERMAL; INTRAMUSCULAR
COMMUNITY
Start: 2025-08-04